# Patient Record
Sex: MALE | Race: WHITE | Employment: UNEMPLOYED | URBAN - METROPOLITAN AREA
[De-identification: names, ages, dates, MRNs, and addresses within clinical notes are randomized per-mention and may not be internally consistent; named-entity substitution may affect disease eponyms.]

---

## 2019-04-29 ENCOUNTER — APPOINTMENT (OUTPATIENT)
Dept: GENERAL RADIOLOGY | Age: 54
End: 2019-04-29
Payer: COMMERCIAL

## 2019-04-29 ENCOUNTER — HOSPITAL ENCOUNTER (EMERGENCY)
Age: 54
Discharge: ANOTHER HOSPITAL | End: 2019-04-30
Attending: EMERGENCY MEDICINE
Payer: COMMERCIAL

## 2019-04-29 VITALS
HEIGHT: 70 IN | WEIGHT: 175 LBS | BODY MASS INDEX: 25.05 KG/M2 | RESPIRATION RATE: 18 BRPM | HEART RATE: 85 BPM | SYSTOLIC BLOOD PRESSURE: 127 MMHG | OXYGEN SATURATION: 96 % | TEMPERATURE: 98.4 F | DIASTOLIC BLOOD PRESSURE: 84 MMHG

## 2019-04-29 DIAGNOSIS — R20.2 LEFT HAND PARESTHESIA: ICD-10-CM

## 2019-04-29 DIAGNOSIS — S50.852A FOREIGN BODY IN LEFT FOREARM, INITIAL ENCOUNTER: Primary | ICD-10-CM

## 2019-04-29 PROCEDURE — 6370000000 HC RX 637 (ALT 250 FOR IP): Performed by: EMERGENCY MEDICINE

## 2019-04-29 PROCEDURE — 96374 THER/PROPH/DIAG INJ IV PUSH: CPT

## 2019-04-29 PROCEDURE — 2500000003 HC RX 250 WO HCPCS: Performed by: PHYSICIAN ASSISTANT

## 2019-04-29 PROCEDURE — 6360000002 HC RX W HCPCS: Performed by: PHYSICIAN ASSISTANT

## 2019-04-29 PROCEDURE — 73090 X-RAY EXAM OF FOREARM: CPT

## 2019-04-29 PROCEDURE — 6370000000 HC RX 637 (ALT 250 FOR IP): Performed by: PHYSICIAN ASSISTANT

## 2019-04-29 PROCEDURE — 90715 TDAP VACCINE 7 YRS/> IM: CPT | Performed by: PHYSICIAN ASSISTANT

## 2019-04-29 PROCEDURE — 99284 EMERGENCY DEPT VISIT MOD MDM: CPT

## 2019-04-29 PROCEDURE — 90471 IMMUNIZATION ADMIN: CPT | Performed by: PHYSICIAN ASSISTANT

## 2019-04-29 RX ORDER — LIDOCAINE HYDROCHLORIDE 20 MG/ML
10 INJECTION, SOLUTION INFILTRATION; PERINEURAL ONCE
Status: COMPLETED | OUTPATIENT
Start: 2019-04-29 | End: 2019-04-29

## 2019-04-29 RX ORDER — HYDROCODONE BITARTRATE AND ACETAMINOPHEN 5; 325 MG/1; MG/1
1 TABLET ORAL ONCE
Status: COMPLETED | OUTPATIENT
Start: 2019-04-29 | End: 2019-04-29

## 2019-04-29 RX ORDER — NICOTINE 21 MG/24HR
1 PATCH, TRANSDERMAL 24 HOURS TRANSDERMAL DAILY
Status: DISCONTINUED | OUTPATIENT
Start: 2019-04-29 | End: 2019-04-30 | Stop reason: HOSPADM

## 2019-04-29 RX ORDER — MORPHINE SULFATE 4 MG/ML
4 INJECTION, SOLUTION INTRAMUSCULAR; INTRAVENOUS EVERY 30 MIN PRN
Status: DISCONTINUED | OUTPATIENT
Start: 2019-04-29 | End: 2019-04-30 | Stop reason: HOSPADM

## 2019-04-29 RX ADMIN — TETANUS TOXOID, REDUCED DIPHTHERIA TOXOID AND ACELLULAR PERTUSSIS VACCINE, ADSORBED 0.5 ML: 5; 2.5; 8; 8; 2.5 SUSPENSION INTRAMUSCULAR at 22:06

## 2019-04-29 RX ADMIN — HYDROCODONE BITARTRATE AND ACETAMINOPHEN 1 TABLET: 5; 325 TABLET ORAL at 22:04

## 2019-04-29 RX ADMIN — LIDOCAINE HYDROCHLORIDE 10 ML: 20 INJECTION, SOLUTION INFILTRATION; PERINEURAL at 22:05

## 2019-04-29 RX ADMIN — MORPHINE SULFATE 4 MG: 4 INJECTION INTRAVENOUS at 23:14

## 2019-04-29 ASSESSMENT — PAIN SCALES - GENERAL
PAINLEVEL_OUTOF10: 10

## 2019-04-29 ASSESSMENT — PAIN DESCRIPTION - ORIENTATION: ORIENTATION: LEFT

## 2019-04-29 ASSESSMENT — PAIN DESCRIPTION - PAIN TYPE: TYPE: ACUTE PAIN

## 2019-04-29 ASSESSMENT — PAIN DESCRIPTION - LOCATION: LOCATION: ARM

## 2019-04-30 NOTE — ED NOTES
Pt wife to nurses station stating pt is agitated and will \"walk out\" and pull his splinter out himself at 1030, wife notified Elizabeth Jackson was speaking to specialist,  Elizabeth Jackson [facility] notified.       Mark Harper RN  04/29/19 1509

## 2019-04-30 NOTE — ED PROVIDER NOTES
conjunction with the Advanced Practice Provider. For all further details of the patient's emergency department visit, please see the Advanced Practice Provider's documentation.         Willie Sherman,   04/29/19 1568

## 2019-04-30 NOTE — ED PROVIDER NOTES
Social History     Socioeconomic History    Marital status: Single     Spouse name: None    Number of children: None    Years of education: None    Highest education level: None   Occupational History    None   Social Needs    Financial resource strain: None    Food insecurity:     Worry: None     Inability: None    Transportation needs:     Medical: None     Non-medical: None   Tobacco Use    Smoking status: Current Some Day Smoker   Substance and Sexual Activity    Alcohol use: Yes     Comment: occasionally     Drug use: Never    Sexual activity: None   Lifestyle    Physical activity:     Days per week: None     Minutes per session: None    Stress: None   Relationships    Social connections:     Talks on phone: None     Gets together: None     Attends Shinto service: None     Active member of club or organization: None     Attends meetings of clubs or organizations: None     Relationship status: None    Intimate partner violence:     Fear of current or ex partner: None     Emotionally abused: None     Physically abused: None     Forced sexual activity: None   Other Topics Concern    None   Social History Narrative    None     History reviewed. No pertinent family history. PHYSICAL EXAM    VITAL SIGNS: /84   Pulse 85   Temp 98.4 °F (36.9 °C) (Oral)   Resp 18   Ht 5' 10\" (1.778 m)   Wt 175 lb (79.4 kg)   SpO2 96%   BMI 25.11 kg/m²   Constitutional:  Well developed, agitated male, nontoxic    HEENT:  Normocephalic, Atraumatic. PERRL, EOMI. Ear canals, nasal passages, oropharynx clear of blood or clear fluid. Musculoskeletal:  No gross deformities. No motor deficits. Distal sensation and capillary refill intact. Vascular: Distal pulses and capillary refill intact. Integument: To the medial volar distal forearm is protruding wide white but thin wooden fragment, entering two fingerwidth breath proximal to the ulnar styloid.   Fragment does not appear clearly to tunnel directly superficially and is not palpable. No exit wound but there is skin tenting to the mid medial forearm more on the dorsal aspect without breakthrough of skin and likely the end of the wooden fragment. Length of foreign body approximately 4-5 cm in length. Tenderness to palpation along the track of the foreign body under the skin to the medial forearm without pulsatile or seeping blood from the entry site and palpation along the foreign body region does cause a radicular tingling pain into the fourth and fifth digits. Decreased sensation with subjective tingling to the tips of fourth and fifth digits. 3/5  strength. No wrist drop. Limited wrist range of motion especially flexion secondary to pain and location of foreign body. Able to wiggle all digits without difficulty. See below for further details. Radial and ulnar pulses are palpable. Brisk capillary refill to all digits. Compartment otherwise soft throughout the left upper arm. Neurologic:  Awake and alert, normal flow of speech. CN 2-12 intact. Psychiatric: Cooperative, pleasant affect        RADIOLOGY/PROCEDURES         XR RADIUS ULNA LEFT (2 VIEWS) (Preliminary result)   Result time 04/29/19 22:08:38   Preliminary result by Radha Sr MD (04/29/19 22:08:38)                Impression:    1. Linear lucency in the medial distal forearm measuring up to 4.2 cm could  reflect a non radiopaque foreign body.  Suggest correlation with physical  exam.  2. No acute bony abnormality. Narrative:    EXAMINATION:  AP AND LATERAL XRAY VIEWS OF THE LEFT FOREARM    4/29/2019 9:55 pm    COMPARISON:  None    HISTORY:  ORDERING SYSTEM PROVIDED HISTORY: large splinter foreign body  TECHNOLOGIST PROVIDED HISTORY:  Reason for exam:->large splinter foreign body  Ordering Physician Provided Reason for Exam: large splinter foreign body  Acuity: Acute  Type of Exam: Initial    FINDINGS:  No acute fracture or dislocation.  Alignment and joint spaces are maintained. The bone mineralization is normal. No abnormal calcifications are present. There is small soft tissue irregularity at the ulnar aspect of the distal  forearm, which presumably reflects the soft tissue injury. Emilie Peaches is a  linear, 4.2 cm soft tissue lucency.  The lateral view demonstrates an  ill-defined curvilinear radiodensity in the dorsal distal forearm, likely  reflecting calcification or overlapping structures.  This is not felt to  reflect a foreign body.                Preliminary result by Bud Johnson MD (04/29/19 22:07:10)                Impression:    1. Linear lucency in the medial distal forearm measuring up to 4.2 cm could  reflect a non radiopaque foreign body.  Suggest correlation with physical  exam.  2. No acute bony abnormality. ED COURSE & MEDICAL DECISION MAKING       Vital signs and nursing notes reviewed during ED course. I have independently evaluated this patient . Supervising  - Dr. Naomi Ayers - present in the Emergency Department, available for consultation, throughout entirety of  patient care. All pertinent Lab data and radiographic results reviewed with patient at bedside. The patient and/or the family were informed of the results of any tests/labs/imaging, the treatment plan, and time was allotted to answer questions. I discussed possibility of infection, retained foreign body, tendon injury, nerve injury. Clinical  IMPRESSION    1. Foreign body in left forearm, initial encounter    2. Left hand paresthesia      Patient presents with penetrating foreign body along the medial forearm with paresthesias radiating into the fourth and fifth digit. On exam, no gross bony deformities. Extremity however there is a wooden foreign body that penetrates along the volar medial forearm, proximal to the ulnar styloid with skin tenting without an exit wound to the dorsal medial forearm.   Unable to clearly visualize the entire foreign body under the skin and palpation along the area of the foreign body does cause radicular tingling into the fourth and fifth digits. No wrist drop however wrist range of motion limited secondary to pain and location of the foreign body. Radial pulses and ulnar pulses are palpable. Test and this was updated in the ED today patient was started on Vicodin for pain. X-ray of the left radius/ulna shows a linear lucency in the medial distal forearm measuring up to 4.2 cm which could reflect a non-radiopaque foreign body which is consistent with location of the noted wooden foreign body. Given the location of the penetrating wound and paresthesia into the hand concerning for ulnar nerve injury or apraxia, we'll plan to consult with on-call orthopedist for further recommendations. I did consult with Dr. Vivek Francis however he also concerned for possible ulnar nerve injuryf lexor tendon injury and recommends transfer to ortho hand specialist for evaluation as he may require surgical wound washout and nerve repair. Plan for transfer was discussed with patient and family member at bedside. They're requesting transfer to [facility] hand specialty care. Started on empiric IV antibiotics, clindamycin given anaphylactic reaction to penicillins. I did consult with Dr. Dara House ortho hand - and discussed patient's history, ED presentation/course including any pertinent laboratory findings and imaging study findings. He/she is agreeable with transfer to [facility] ED for speciality evaluation. Dr. Loralyn Duane ED attending did accept patient for ED to ED transfer. Diagnosis and plan discussed in detail with patient who understands and agrees. Return to emergency Department precautions were discussed in detail with patient who understands and agrees.     I did discuss this patient's history, ED presentation/course with my attending physician - Dr. Reema Jean

## 2020-06-10 ENCOUNTER — HOSPITAL ENCOUNTER (EMERGENCY)
Age: 55
Discharge: HOME | End: 2020-06-10
Payer: COMMERCIAL

## 2020-06-10 ENCOUNTER — APPOINTMENT (OUTPATIENT)
Dept: GENERAL RADIOLOGY | Age: 55
End: 2020-06-10
Payer: COMMERCIAL

## 2020-06-10 VITALS
DIASTOLIC BLOOD PRESSURE: 104 MMHG | HEIGHT: 70 IN | TEMPERATURE: 98.4 F | OXYGEN SATURATION: 99 % | WEIGHT: 200 LBS | SYSTOLIC BLOOD PRESSURE: 136 MMHG | HEART RATE: 83 BPM | BODY MASS INDEX: 28.63 KG/M2 | RESPIRATION RATE: 18 BRPM

## 2020-06-10 PROCEDURE — 96372 THER/PROPH/DIAG INJ SC/IM: CPT

## 2020-06-10 PROCEDURE — 72100 X-RAY EXAM L-S SPINE 2/3 VWS: CPT

## 2020-06-10 PROCEDURE — 99283 EMERGENCY DEPT VISIT LOW MDM: CPT

## 2020-06-10 PROCEDURE — 6370000000 HC RX 637 (ALT 250 FOR IP): Performed by: PHYSICIAN ASSISTANT

## 2020-06-10 PROCEDURE — 6360000002 HC RX W HCPCS: Performed by: PHYSICIAN ASSISTANT

## 2020-06-10 RX ORDER — METHOCARBAMOL 500 MG/1
500 TABLET, FILM COATED ORAL 4 TIMES DAILY
Qty: 40 TABLET | Refills: 0 | Status: SHIPPED | OUTPATIENT
Start: 2020-06-10 | End: 2020-06-20

## 2020-06-10 RX ORDER — METHYLPREDNISOLONE 4 MG/1
TABLET ORAL
Qty: 1 KIT | Refills: 0 | Status: SHIPPED | OUTPATIENT
Start: 2020-06-10 | End: 2020-06-16

## 2020-06-10 RX ORDER — HYDROCODONE BITARTRATE AND ACETAMINOPHEN 5; 325 MG/1; MG/1
1 TABLET ORAL ONCE
Status: COMPLETED | OUTPATIENT
Start: 2020-06-10 | End: 2020-06-10

## 2020-06-10 RX ORDER — HYDROCODONE BITARTRATE AND ACETAMINOPHEN 5; 325 MG/1; MG/1
1 TABLET ORAL EVERY 4 HOURS PRN
Qty: 15 TABLET | Refills: 0 | Status: SHIPPED | OUTPATIENT
Start: 2020-06-10 | End: 2020-06-17

## 2020-06-10 RX ORDER — DIAZEPAM 5 MG/1
5 TABLET ORAL ONCE
Status: COMPLETED | OUTPATIENT
Start: 2020-06-10 | End: 2020-06-10

## 2020-06-10 RX ORDER — KETOROLAC TROMETHAMINE 30 MG/ML
30 INJECTION, SOLUTION INTRAMUSCULAR; INTRAVENOUS ONCE
Status: COMPLETED | OUTPATIENT
Start: 2020-06-10 | End: 2020-06-10

## 2020-06-10 RX ADMIN — KETOROLAC TROMETHAMINE 30 MG: 30 INJECTION, SOLUTION INTRAMUSCULAR; INTRAVENOUS at 08:52

## 2020-06-10 RX ADMIN — HYDROCODONE BITARTRATE AND ACETAMINOPHEN 1 TABLET: 5; 325 TABLET ORAL at 08:52

## 2020-06-10 RX ADMIN — DIAZEPAM 5 MG: 5 TABLET ORAL at 08:52

## 2020-06-10 ASSESSMENT — PAIN SCALES - GENERAL
PAINLEVEL_OUTOF10: 8
PAINLEVEL_OUTOF10: 10

## 2020-06-10 ASSESSMENT — PAIN DESCRIPTION - DESCRIPTORS: DESCRIPTORS: STABBING

## 2020-06-10 ASSESSMENT — PAIN DESCRIPTION - FREQUENCY: FREQUENCY: CONTINUOUS

## 2020-06-10 ASSESSMENT — PAIN DESCRIPTION - LOCATION: LOCATION: BACK

## 2020-06-10 ASSESSMENT — PAIN DESCRIPTION - ORIENTATION: ORIENTATION: LOWER

## 2020-06-10 ASSESSMENT — PAIN DESCRIPTION - PAIN TYPE: TYPE: ACUTE PAIN

## 2020-06-10 NOTE — ED NOTES
Reviewed discharge instructions with patient and family. All voice understanding/deny questions. Wheelchair offered, declines. Ambulates without difficulty.        Analy Segovia RN  06/10/20 [id number]

## 2020-06-10 NOTE — ED PROVIDER NOTES
afebrile    Constitutional:  Well developed, well nourished. HENT:  Atraumatic,  Moist mucus membranes. Eyes:  No orbital trauma. No scleral icterus. Neck: No JVD, supple. No enlarged lymph nodes. Cardiovascular:  Normal rate, regular rhythm, no murmurs/rubs/gallops  Respiratory:  No respiratory distress, normal breath sounds. Abdomen: Bowel sounds normal, Soft, No tenderness, no masses. Musculoskeletal:     No lower extremity swelling, discoloration, focal tenderness, palpable cord. Negative Julinaa's sign negative  Integument:  Well hydrated, no rash, no pallor. Back:   - No gross deformity, swelling, or discoloration.    -  + generalized lumbar and Paralumbar tenderness without focus. No masses, fluctuance, warmth, or skin changes.  - No localized midline bony tenderness.   - No change in pain with forward flexion  - SLR test negative     No CVA tenderness to percussion  Neurologic:    No obvious neurological deficits. Patient moves without obvious difficulty or weakness. Vascular:    Femoral & Distal pulses, & capillary refill intact bilateral lower extremities    RADIOLOGY/PROCEDURES    Xr Lumbar Spine (2-3 Views)    Result Date: 6/10/2020  EXAMINATION: THREE XRAY VIEWS OF THE LUMBAR SPINE 6/10/2020 8:52 am COMPARISON: None. HISTORY: ORDERING SYSTEM PROVIDED HISTORY: low back pain TECHNOLOGIST PROVIDED HISTORY: Reason for exam:->low back pain Reason for Exam: lumbar pain since this morn Acuity: Acute Type of Exam: Initial Relevant Medical/Surgical History: pt sts prev disc repair FINDINGS: Moderate degenerative disc disease identified at L4-5. Mild degenerative disc disease is appreciated at L2-L3. No acute fracture or dislocation is evident. Moderate degenerative disc disease at L4-L5. Mild degenerative disc disease at L2-L3. No evidence of acute fracture.          ED COURSE & MEDICAL DECISION MAKING                     Based on Pt's history (including stratification of the above red

## 2020-08-26 ENCOUNTER — APPOINTMENT (OUTPATIENT)
Dept: GENERAL RADIOLOGY | Age: 55
End: 2020-08-26
Payer: COMMERCIAL

## 2020-08-27 ENCOUNTER — HOSPITAL ENCOUNTER (EMERGENCY)
Age: 55
Discharge: HOME | End: 2020-08-27
Payer: COMMERCIAL

## 2020-08-27 VITALS
DIASTOLIC BLOOD PRESSURE: 82 MMHG | HEART RATE: 92 BPM | SYSTOLIC BLOOD PRESSURE: 122 MMHG | TEMPERATURE: 98.1 F | OXYGEN SATURATION: 98 % | RESPIRATION RATE: 18 BRPM

## 2020-08-27 LAB
ALBUMIN SERPL-MCNC: 4.5 GM/DL (ref 3.4–5)
ALP BLD-CCNC: 86 IU/L (ref 40–128)
ALT SERPL-CCNC: 52 U/L (ref 10–40)
ANION GAP SERPL CALCULATED.3IONS-SCNC: 16 MMOL/L (ref 4–16)
AST SERPL-CCNC: 44 IU/L (ref 15–37)
BASOPHILS ABSOLUTE: 0.1 K/CU MM
BASOPHILS RELATIVE PERCENT: 1.1 % (ref 0–1)
BILIRUB SERPL-MCNC: 0.9 MG/DL (ref 0–1)
BUN BLDV-MCNC: 34 MG/DL (ref 6–23)
CALCIUM SERPL-MCNC: 9.6 MG/DL (ref 8.3–10.6)
CHLORIDE BLD-SCNC: 102 MMOL/L (ref 99–110)
CO2: 20 MMOL/L (ref 21–32)
CREAT SERPL-MCNC: 1.5 MG/DL (ref 0.9–1.3)
DIFFERENTIAL TYPE: ABNORMAL
EOSINOPHILS ABSOLUTE: 0.2 K/CU MM
EOSINOPHILS RELATIVE PERCENT: 2 % (ref 0–3)
GFR AFRICAN AMERICAN: 59 ML/MIN/1.73M2
GFR NON-AFRICAN AMERICAN: 49 ML/MIN/1.73M2
GLUCOSE BLD-MCNC: 114 MG/DL (ref 70–99)
HCT VFR BLD CALC: 44.9 % (ref 42–52)
HEMOGLOBIN: 14.8 GM/DL (ref 13.5–18)
IMMATURE NEUTROPHIL %: 0.4 % (ref 0–0.43)
LYMPHOCYTES ABSOLUTE: 2.4 K/CU MM
LYMPHOCYTES RELATIVE PERCENT: 32.3 % (ref 24–44)
MCH RBC QN AUTO: 31.2 PG (ref 27–31)
MCHC RBC AUTO-ENTMCNC: 33 % (ref 32–36)
MCV RBC AUTO: 94.7 FL (ref 78–100)
MONOCYTES ABSOLUTE: 1 K/CU MM
MONOCYTES RELATIVE PERCENT: 13.5 % (ref 0–4)
NUCLEATED RBC %: 0 %
PDW BLD-RTO: 12.9 % (ref 11.7–14.9)
PLATELET # BLD: 256 K/CU MM (ref 140–440)
PMV BLD AUTO: 9.1 FL (ref 7.5–11.1)
POTASSIUM SERPL-SCNC: 3.3 MMOL/L (ref 3.5–5.1)
RBC # BLD: 4.74 M/CU MM (ref 4.6–6.2)
SEGMENTED NEUTROPHILS ABSOLUTE COUNT: 3.8 K/CU MM
SEGMENTED NEUTROPHILS RELATIVE PERCENT: 50.7 % (ref 36–66)
SODIUM BLD-SCNC: 138 MMOL/L (ref 135–145)
TOTAL IMMATURE NEUTOROPHIL: 0.03 K/CU MM
TOTAL NUCLEATED RBC: 0 K/CU MM
TOTAL PROTEIN: 7.6 GM/DL (ref 6.4–8.2)
WBC # BLD: 7.5 K/CU MM (ref 4–10.5)

## 2020-08-27 PROCEDURE — 85025 COMPLETE CBC W/AUTO DIFF WBC: CPT

## 2020-08-27 PROCEDURE — 6370000000 HC RX 637 (ALT 250 FOR IP): Performed by: PHYSICIAN ASSISTANT

## 2020-08-27 PROCEDURE — 80053 COMPREHEN METABOLIC PANEL: CPT

## 2020-08-27 PROCEDURE — 94640 AIRWAY INHALATION TREATMENT: CPT

## 2020-08-27 PROCEDURE — 99284 EMERGENCY DEPT VISIT MOD MDM: CPT

## 2020-08-27 RX ORDER — BENZONATATE 100 MG/1
100 CAPSULE ORAL 3 TIMES DAILY PRN
Qty: 10 CAPSULE | Refills: 0 | Status: SHIPPED | OUTPATIENT
Start: 2020-08-27 | End: 2020-09-03

## 2020-08-27 RX ORDER — BENZONATATE 100 MG/1
100 CAPSULE ORAL ONCE
Status: COMPLETED | OUTPATIENT
Start: 2020-08-27 | End: 2020-08-27

## 2020-08-27 RX ORDER — AZITHROMYCIN 250 MG/1
TABLET, FILM COATED ORAL
Qty: 1 PACKET | Refills: 0 | Status: SHIPPED | OUTPATIENT
Start: 2020-08-27 | End: 2020-08-31

## 2020-08-27 RX ORDER — POTASSIUM CHLORIDE 20 MEQ/1
40 TABLET, EXTENDED RELEASE ORAL ONCE
Status: COMPLETED | OUTPATIENT
Start: 2020-08-27 | End: 2020-08-27

## 2020-08-27 RX ORDER — ALBUTEROL SULFATE 90 UG/1
2 AEROSOL, METERED RESPIRATORY (INHALATION) ONCE
Status: COMPLETED | OUTPATIENT
Start: 2020-08-27 | End: 2020-08-27

## 2020-08-27 RX ORDER — ALBUTEROL SULFATE 90 UG/1
2 AEROSOL, METERED RESPIRATORY (INHALATION) EVERY 4 HOURS PRN
Qty: 1 INHALER | Refills: 1 | Status: SHIPPED | OUTPATIENT
Start: 2020-08-27 | End: 2020-09-26

## 2020-08-27 RX ADMIN — POTASSIUM CHLORIDE 40 MEQ: 1500 TABLET, EXTENDED RELEASE ORAL at 02:30

## 2020-08-27 RX ADMIN — BENZONATATE 100 MG: 100 CAPSULE ORAL at 02:30

## 2020-08-27 RX ADMIN — ALBUTEROL SULFATE 2 PUFF: 90 AEROSOL, METERED RESPIRATORY (INHALATION) at 03:00

## 2020-08-27 NOTE — ED PROVIDER NOTES
Triage Chief Complaint:   Cough (Reports coughing up parasites for the past 2 days)    [place]:  Yvette Sawyer is a 54 y.o. male that presents with a complaint of cough. X2 days. He states he has been coughing up brown stuff. And makes him concerned that there are parasites as his wife told him that she looked at the particular that he is coughing up under the microscope and it looks like parasites with legs. He denies any fever chills nausea vomiting diarrhea abdominal pain chest pain back pain. No COVID exposure. No lightheadedness or dizziness. He states that he vapes. But rarely smokes cigarettes    ROS:  At least 06 systems reviewed and otherwise negative except as in the [address] No past medical history on file. No past surgical history on file. No family history on file. Social History     Socioeconomic History    Marital status: Single     Spouse name: Not on file    Number of children: Not on file    Years of education: Not on file    Highest education level: Not on file   Occupational History    Not on file   Social Needs    Financial resource strain: Not on file    Food insecurity     Worry: Not on file     Inability: Not on file    Transportation needs     Medical: Not on file     Non-medical: Not on file   Tobacco Use    Smoking status: Current Some Day Smoker    Tobacco comment: only a couple a day, pt trying to quit.    Substance and Sexual Activity    Alcohol use: Yes     Comment: occasionally     Drug use: Never    Sexual activity: Not on file   Lifestyle    Physical activity     Days per week: Not on file     Minutes per session: Not on file    Stress: Not on file   Relationships    Social connections     Talks on phone: Not on file     Gets together: Not on file     Attends Hoahaoism service: Not on file     Active member of club or organization: Not on file     Attends meetings of clubs or organizations: Not on file     Relationship status: Not on file    Intimate partner violence     Fear of current or ex partner: Not on file     Emotionally abused: Not on file     Physically abused: Not on file     Forced sexual activity: Not on file   Other Topics Concern    Not on file   Social History Narrative    Not on file     Current Facility-Administered Medications   Medication Dose Route Frequency Provider Last Rate Last Dose    albuterol sulfate  (90 Base) MCG/ACT inhaler 2 puff  2 puff Inhalation Once Berkley Gordon PA-C         Current Outpatient Medications   Medication Sig Dispense Refill    albuterol sulfate HFA (PROVENTIL HFA) 108 (90 Base) MCG/ACT inhaler Inhale 2 puffs into the lungs every 4 hours as needed for Wheezing or Shortness of Breath With spacer (and mask if indicated). Thanks. 1 Inhaler 1    benzonatate (TESSALON PERLES) 100 MG capsule Take 1 capsule by mouth 3 times daily as needed for Cough 10 capsule 0    azithromycin (ZITHROMAX Z-MERRY) 250 MG tablet Take 2 tablets (500 mg) on Day 1, and then take 1 tablet (250 mg) on days 2 through 5. 1 packet 0     Allergies   Allergen Reactions    Bee Venom Anaphylaxis    Pcn [Penicillins] Anaphylaxis       Nursing Notes Reviewed    Physical Exam:  ED Triage Vitals [08/27/20 0001]   Enc Vitals Group      /89      Pulse 97      Resp 18      Temp 98.1 °F (36.7 °C)      Temp Source Oral      SpO2 97 %      Weight       Height       Head Circumference       Peak Flow       Pain Score       Pain Loc       Pain Edu? Excl. in [address]? GENERAL APPEARANCE: Awake and alert. Cooperative. No acute distress. HEAD: Normocephalic. Atraumatic. EYES: EOM's grossly intact. Sclera anicteric. PERRLA. Conjunctiva non injected. No discharge  ENT: Mucous membranes are moist. No trismus. Posterior Pharynx non injected, no tongue swelling, airway patent, no tonsillar edema. No exudates noted. No abscess. No discharge. Middle ear spaces clear. Tympanic membrane non injected. Auditory canal clear. NECK: Supple.  No meningismus. No palpable masses. No lymphadenopathy. CARDIOVASCULAR: RRR. No rubs, murmurs, gallops, clicks. Radial pulses 2+. Pedal Pulses 2+. Capillary refill <2 seconds. LUNGS: Respirations unlabored. CTAB. ABDOMEN: Soft. Non-tender. No guarding or rebound. No organomegaly. No palpable masses  MUSCULOSKELETAL: No acute deformities. SKIN: Warm and dry. No rash, No erythema, No edema. No ecchymoses. NEUROLOGICAL: No gross facial drooping. Moves all 4 extremities spontaneously. PSYCHIATRIC: Normal mood.     I have reviewed and interpreted all of the currently available lab results from this visit (if applicable):  Results for orders placed or performed during the hospital encounter of 08/27/20   CBC with Auto Diff   Result Value Ref Range    WBC 7.5 4.0 - 10.5 K/CU MM    RBC 4.74 4.6 - 6.2 M/CU MM    Hemoglobin 14.8 13.5 - 18.0 GM/DL    Hematocrit 44.9 42 - 52 %    MCV 94.7 78 - 100 FL    MCH 31.2 (H) 27 - 31 PG    MCHC 33.0 32.0 - 36.0 %    RDW 12.9 11.7 - 14.9 %    Platelets 539 069 - 161 K/CU MM    MPV 9.1 7.5 - 11.1 FL    Differential Type AUTOMATED DIFFERENTIAL     Segs Relative 50.7 36 - 66 %    Lymphocytes % 32.3 24 - 44 %    Monocytes % 13.5 (H) 0 - 4 %    Eosinophils % 2.0 0 - 3 %    Basophils % 1.1 (H) 0 - 1 %    Segs Absolute 3.8 K/CU MM    Lymphocytes Absolute 2.4 K/CU MM    Monocytes Absolute 1.0 K/CU MM    Eosinophils Absolute 0.2 K/CU MM    Basophils Absolute 0.1 K/CU MM    Nucleated RBC % 0.0 %    Total Nucleated RBC 0.0 K/CU MM    Total Immature Neutrophil 0.03 K/CU MM    Immature Neutrophil % 0.4 0 - 0.43 %   CMP   Result Value Ref Range    Sodium 138 135 - 145 MMOL/L    Potassium 3.3 (L) 3.5 - 5.1 MMOL/L    Chloride 102 99 - 110 mMol/L    CO2 20 (L) 21 - 32 MMOL/L    BUN 34 (H) 6 - 23 MG/DL    CREATININE 1.5 (H) 0.9 - 1.3 MG/DL    Glucose 114 (H) 70 - 99 MG/DL    Calcium 9.6 8.3 - 10.6 MG/DL    Alb 4.5 3.4 - 5.0 GM/DL    Total Protein 7.6 6.4 - 8.2 GM/DL    Total Bilirubin 0.9 0.0 - 1.0 MG/DL    ALT 52 (H) 10 - 40 U/L    AST 44 (H) 15 - 37 IU/L    Alkaline Phosphatase 86 40 - 128 IU/L    GFR Non- 49 (L) >60 mL/min/1.73m2    GFR  59 (L) >60 mL/min/1.73m2    Anion Gap 16 4 - 16      Radiographs (if obtained):  [] The following radiograph was interpreted by myself in the absence of a radiologist:   [] Radiologist's Report Reviewed:  XR CHEST (2 VW)   Final Result   No acute cardiopulmonary process. EKG (if obtained):   Please See Note of attending physician for EKG interpretation. Chart review shows recent radiograph(s):  Xr Chest (2 Vw)    Result Date: 8/27/2020  EXAMINATION: TWO XRAY VIEWS OF THE CHEST 8/26/2020 11:46 pm COMPARISON: None. HISTORY: ORDERING SYSTEM PROVIDED HISTORY: cough TECHNOLOGIST PROVIDED HISTORY: Reason for exam:->cough FINDINGS: There is no acute consolidation or effusion. There is no pneumothorax. The mediastinal structures are unremarkable. The upper abdomen is unremarkable. The extrathoracic soft tissues are unremarkable. There is no acute osseous abnormality. No acute cardiopulmonary process. MDM:   History Signs and symptoms congruent with URI. Doubt meningitis, pneumonia, bronchitis, respiratory distress, asthma exacerbation, retropharengial abscess, ludwigs angina, chronic sinusitis, otitis, streptococcal pharyngitis, conjunctivitis, pulmonary embolism, pneumothorax, other. Xray is negative for any acute cardiopulmonary processes. Pt is to be discharged home. Pt is told to return immediately to the emergency department if he has any new, worrisome or worsening symptoms. Pt is to follow up with PCP within 2 days. Patient vocalizes agreement and understanding with this plan and he has no questions upon disposition. Pt is comfortable upon disposition home. Patient is stable, Patients vital signs are stable. Vital signs and nursing notes reviewed during ED course.      I independently saw patient today

## 2020-12-03 ENCOUNTER — APPOINTMENT (OUTPATIENT)
Dept: GENERAL RADIOLOGY | Age: 55
End: 2020-12-03
Payer: COMMERCIAL

## 2020-12-03 ENCOUNTER — HOSPITAL ENCOUNTER (EMERGENCY)
Age: 55
Discharge: HOME | End: 2020-12-03
Attending: EMERGENCY MEDICINE
Payer: COMMERCIAL

## 2020-12-03 ENCOUNTER — APPOINTMENT (OUTPATIENT)
Dept: CT IMAGING | Age: 55
End: 2020-12-03
Payer: COMMERCIAL

## 2020-12-03 VITALS
DIASTOLIC BLOOD PRESSURE: 70 MMHG | OXYGEN SATURATION: 92 % | HEART RATE: 79 BPM | SYSTOLIC BLOOD PRESSURE: 102 MMHG | WEIGHT: 200 LBS | RESPIRATION RATE: 16 BRPM | BODY MASS INDEX: 28.63 KG/M2 | HEIGHT: 70 IN

## 2020-12-03 PROCEDURE — 6370000000 HC RX 637 (ALT 250 FOR IP): Performed by: PHYSICIAN ASSISTANT

## 2020-12-03 PROCEDURE — 72192 CT PELVIS W/O DYE: CPT

## 2020-12-03 PROCEDURE — 73502 X-RAY EXAM HIP UNI 2-3 VIEWS: CPT

## 2020-12-03 PROCEDURE — 99285 EMERGENCY DEPT VISIT HI MDM: CPT

## 2020-12-03 RX ORDER — IBUPROFEN 600 MG/1
600 TABLET ORAL EVERY 6 HOURS PRN
Qty: 30 TABLET | Refills: 0 | Status: SHIPPED | OUTPATIENT
Start: 2020-12-03

## 2020-12-03 RX ORDER — LIDOCAINE 4 G/G
1 PATCH TOPICAL ONCE
Status: DISCONTINUED | OUTPATIENT
Start: 2020-12-03 | End: 2020-12-03 | Stop reason: HOSPADM

## 2020-12-03 RX ORDER — LIDOCAINE 4 G/G
1 PATCH TOPICAL DAILY PRN
Qty: 15 PATCH | Refills: 0 | Status: SHIPPED | OUTPATIENT
Start: 2020-12-03 | End: 2021-01-02

## 2020-12-03 ASSESSMENT — PAIN DESCRIPTION - PAIN TYPE: TYPE: ACUTE PAIN

## 2020-12-03 ASSESSMENT — PAIN DESCRIPTION - ORIENTATION: ORIENTATION: LEFT

## 2020-12-03 ASSESSMENT — PAIN DESCRIPTION - LOCATION: LOCATION: HIP

## 2020-12-03 ASSESSMENT — PAIN SCALES - GENERAL: PAINLEVEL_OUTOF10: 7

## 2020-12-03 NOTE — ED NOTES
Reviewed discharge instructions and prescriptions, pt V/U. Pt discharged home.      Diana aGrcia RN  12/03/20 [id number]

## 2020-12-04 NOTE — ED PROVIDER NOTES
EMERGENCY DEPARTMENT ENCOUNTER      PCP: No primary care provider on file. CHIEF COMPLAINT    Chief Complaint   Patient presents with    Hip Pain     left-fell on ice about 1 hour ago       HPI    Aurora Mendoza is a 54 y.o. male who presents with Left hip pain after fall. Onset was about 1 hr PTA. The context was patient slipped on ice and fell landing on left hip. Patient denies hitting his head and denies loss of consciousness. Duration of pain has been constant since the onset. The pain severity is 7/10. The patient has no associated other injuries. The pain is aggravated by hip movement, ambulation. Patient has not tried medication for symptoms. Patient denies fever, chills, distal numbness, tingling, weakness, functional deficit. Patient denies other injuries. REVIEW OF SYSTEMS    General: Denies fever or chills  Cardiac: Denies chest pain  Pulmonary: Denies shortness of breath  GI: Denies abdominal pain, vomiting, or diarrhea  : No dysuria or hematuria  Neuro:  Denies numbness, tingling, weakness. Denies headache. Denies loss of consciousness. Musculoskeletal: See HPI; Denies any other musculoskeletal injuries, including chest wall and back. All other review of systems are negative  See HPI and nursing notes for additional information       [address]    History reviewed. No pertinent past medical history.   Past Surgical History:   Procedure Laterality Date    ARM SURGERY         CURRENT MEDICATIONS    Current Outpatient Rx   Medication Sig Dispense Refill    lidocaine 4 % external patch Place 1 patch onto the skin daily as needed (for pain) 12 hrs on, 12 hrs off. 15 patch 0    ibuprofen (ADVIL;MOTRIN) 600 MG tablet Take 1 tablet by mouth every 6 hours as needed for Pain 30 tablet 0    albuterol sulfate HFA (PROVENTIL HFA) 108 (90 Base) MCG/ACT inhaler Inhale 2 puffs into the lungs every 4 hours as needed for Wheezing or Shortness of Breath With spacer (and mask if indicated). Thanks. 1 Inhaler 1       ALLERGIES    Allergies   Allergen Reactions    Bee Venom Anaphylaxis    Pcn [Penicillins] Anaphylaxis       SOCIAL & FAMILY HISTORY    Social History     Socioeconomic History    Marital status: Single     Spouse name: None    Number of children: None    Years of education: None    Highest education level: None   Occupational History    None   Social Needs    Financial resource strain: None    Food insecurity     Worry: None     Inability: None    Transportation needs     Medical: None     Non-medical: None   Tobacco Use    Smoking status: Current Some Day Smoker     Types: Cigarettes    Smokeless tobacco: Never Used    Tobacco comment: only a couple a day, pt trying to quit. Substance and Sexual Activity    Alcohol use: Yes     Comment: occasionally     Drug use: Never    Sexual activity: None   Lifestyle    Physical activity     Days per week: None     Minutes per session: None    Stress: None   Relationships    Social connections     Talks on phone: None     Gets together: None     Attends Jewish service: None     Active member of club or organization: None     Attends meetings of clubs or organizations: None     Relationship status: None    Intimate partner violence     Fear of current or ex partner: None     Emotionally abused: None     Physically abused: None     Forced sexual activity: None   Other Topics Concern    None   Social History Narrative    None     History reviewed. No pertinent family history. PHYSICAL EXAM    VITAL SIGNS: /70   Pulse 79   Resp 16   Ht 5' 10\" (1.778 m)   Wt 200 lb (90.7 kg)   SpO2 92%   BMI 28.70 kg/m²   Constitutional:  Well developed, Appears comfortable  ENT:  Atraumatic. PERRL, EOMI. Musculoskeletal:    Left HIP exam:   -Inspection:   No gross deformities, swelling, discolorations. Patient spontaneously moves affect leg. No rash. - Palpation: + Tenderness over anterior aspect of hip.  No lateral or posterior hip TTP. No redness. No warmth to palpation        -ROM:  Full AROM    No swelling, discoloration, or tenderness to palpation of the ipsilateral knee. Full AROM of ipsilateral knee. Remaining lower and upper extremity exam as well as back exam without abnormality. No spinal TTP. No paraspinal TTP. No palpable step-offs or deformities of spine. Respiratory:  Lungs clear. Vascular: Distal pulses (DP, PT) intact on affected side. Integument:  Well hydrated, no rash   Neurologic:  Awake and alert, normal flow of speech. Cranial nerves II through XII intact. No obvious focal deficits. Distal sensation, motor, capillary refill intact. Psychiatric: Cooperative, pleasant affect        RADIOLOGY/PROCEDURES    CT PELVIS WO CONTRAST Additional Contrast? None   Final Result   1. No acute fracture. 2. Moderate degenerative changes are seen involving the left hip. XR HIP 2-3 VW W PELVIS LEFT   Final Result   Questionable nondisplaced subcapital fracture of the left femoral neck. CT   scan or preferably MRI may be obtained to confirm. ED COURSE & MEDICAL DECISION MAKING       Vital signs and nursing notes reviewed during ED course. I have independently evaluated this patient. Supervising physician present in the Emergency Department, available for consultation, throughout entirety of  patient care. Patient presents as above. History and exam consistent with musculoskeletal pain of hip after mechanical fall. While in the ED today, left hip and pelvis xray was obtained and reveals questionable nondisplaced subcapital fracture of left femoral neck per radiologist read who recommended CT or MRI to confirm. CT pelvis obtained and reveals that there is no acute fracture but rather some moderate degenerative changes in the left hip. Patient treated with lidocaine patch in the ED with some improvement in pain. Patient declined other pain medication in the ED.    Patient is nontoxic appearing. Vital signs stable. Patient denies fever. Patient is stable for outpatient management. Patient discharged home with prescriptions for lidocaine patches and ibuprofen- we discussed medications. All pertinent Lab data and radiographic results reviewed with patient at bedside. The patient and/or the family were informed of the results of any tests/labs/imaging, the treatment plan, and time was allotted to answer questions. Diagnosis, disposition, and treatment plan reviewed in detail with patient. Patient understands and agrees to follow-up with walk in clinic for recheck in 2 days and orthopedist for recheck in 5-7 days as needed as we discussed today. Patient understands and agrees to return to emergency department for any new or worsening symptoms - strict return precautions given. Clinical  IMPRESSION    1. Left hip pain    2.  Fall due to slipping on ice or snow, initial encounter        Disposition referral (if applicable):  [facility]  [address] [place] [address]. [phone]  [phone]  Call today  [address] in 2 days    Smiley Vega MD  [address]  [phone]      Recheck in 5-7 days, As needed    [facility] Emergency Department  [facility] [phone]  Go to   Return to ED if symptoms worsen or new symptoms      Disposition medications (if applicable):  Discharge Medication List as of 12/3/2020  4:39 AM      START taking these medications    Details   lidocaine 4 % external patch Place 1 patch onto the skin daily as needed (for pain) 12 hrs on, 12 hrs off., Transdermal, DAILY PRN Starting Thu 12/3/2020, Until Sat 1/2/2021, For 30 days, Disp-15 patch,R-0, Print      ibuprofen (ADVIL;MOTRIN) 600 MG tablet Take 1 tablet by mouth every 6 hours as needed for Pain, Disp-30 tablet,R-0Print             Comment: Please note this report has been produced using speech recognition software and may contain errors related to that system including errors in grammar, punctuation, and spelling, as well as words and phrases that may be inappropriate. If there are any questions or concerns please feel free to contact the dictating provider for clarification.         Juana Rooney PA-C  12/04/20 0128

## 2020-12-04 NOTE — ED PROVIDER NOTES
Patient signed up for in error. I did not see or evaluate this patient.       [address],   12/03/20 2004

## 2021-03-26 ENCOUNTER — APPOINTMENT (OUTPATIENT)
Dept: GENERAL RADIOLOGY | Age: 56
DRG: 263 | End: 2021-03-26
Payer: COMMERCIAL

## 2021-03-26 ENCOUNTER — APPOINTMENT (OUTPATIENT)
Dept: ULTRASOUND IMAGING | Age: 56
DRG: 263 | End: 2021-03-26
Payer: COMMERCIAL

## 2021-03-26 ENCOUNTER — APPOINTMENT (OUTPATIENT)
Dept: CT IMAGING | Age: 56
DRG: 263 | End: 2021-03-26
Payer: COMMERCIAL

## 2021-03-26 ENCOUNTER — HOSPITAL ENCOUNTER (INPATIENT)
Age: 56
LOS: 2 days | Discharge: HOME | DRG: 263 | End: 2021-03-29
Attending: STUDENT IN AN ORGANIZED HEALTH CARE EDUCATION/TRAINING PROGRAM | Admitting: STUDENT IN AN ORGANIZED HEALTH CARE EDUCATION/TRAINING PROGRAM
Payer: COMMERCIAL

## 2021-03-26 DIAGNOSIS — J18.9 PNEUMONIA DUE TO ORGANISM: ICD-10-CM

## 2021-03-26 DIAGNOSIS — E87.6 HYPOKALEMIA: ICD-10-CM

## 2021-03-26 DIAGNOSIS — E83.42 HYPOMAGNESEMIA: ICD-10-CM

## 2021-03-26 DIAGNOSIS — R79.89 ELEVATED LFTS: ICD-10-CM

## 2021-03-26 DIAGNOSIS — K81.9 CHOLECYSTITIS: Primary | ICD-10-CM

## 2021-03-26 LAB
ALBUMIN SERPL-MCNC: 2.9 GM/DL (ref 3.4–5)
ALP BLD-CCNC: 386 IU/L (ref 40–129)
ALT SERPL-CCNC: 883 U/L (ref 10–40)
ANION GAP SERPL CALCULATED.3IONS-SCNC: 9 MMOL/L (ref 4–16)
AST SERPL-CCNC: 744 IU/L (ref 15–37)
BACTERIA: NEGATIVE /HPF
BASOPHILS ABSOLUTE: 0.1 K/CU MM
BASOPHILS RELATIVE PERCENT: 1.3 % (ref 0–1)
BILIRUB SERPL-MCNC: 1.8 MG/DL (ref 0–1)
BILIRUBIN URINE: ABNORMAL MG/DL
BLOOD, URINE: NEGATIVE
BUN BLDV-MCNC: 16 MG/DL (ref 6–23)
CALCIUM SERPL-MCNC: 8.4 MG/DL (ref 8.3–10.6)
CHLORIDE BLD-SCNC: 101 MMOL/L (ref 99–110)
CLARITY: ABNORMAL
CO2: 27 MMOL/L (ref 21–32)
COLOR: ABNORMAL
CREAT SERPL-MCNC: 0.8 MG/DL (ref 0.9–1.3)
DIFFERENTIAL TYPE: ABNORMAL
EOSINOPHILS ABSOLUTE: 0.2 K/CU MM
EOSINOPHILS RELATIVE PERCENT: 2.9 % (ref 0–3)
GFR AFRICAN AMERICAN: >60 ML/MIN/1.73M2
GFR NON-AFRICAN AMERICAN: >60 ML/MIN/1.73M2
GLUCOSE BLD-MCNC: 122 MG/DL (ref 70–99)
GLUCOSE, URINE: NEGATIVE MG/DL
HCT VFR BLD CALC: 40.7 % (ref 42–52)
HEMOGLOBIN: 13.3 GM/DL (ref 13.5–18)
ICTOTEST: NEGATIVE
IMMATURE NEUTROPHIL %: 0.3 % (ref 0–0.43)
KETONES, URINE: NEGATIVE MG/DL
LEUKOCYTE ESTERASE, URINE: NEGATIVE
LIPASE: 22 IU/L (ref 13–60)
LYMPHOCYTES ABSOLUTE: 2.7 K/CU MM
LYMPHOCYTES RELATIVE PERCENT: 38.3 % (ref 24–44)
MAGNESIUM: 1.7 MG/DL (ref 1.8–2.4)
MCH RBC QN AUTO: 29.3 PG (ref 27–31)
MCHC RBC AUTO-ENTMCNC: 32.7 % (ref 32–36)
MCV RBC AUTO: 89.6 FL (ref 78–100)
MONOCYTES ABSOLUTE: 0.9 K/CU MM
MONOCYTES RELATIVE PERCENT: 12.4 % (ref 0–4)
NITRITE URINE, QUANTITATIVE: NEGATIVE
NUCLEATED RBC %: 0 %
PDW BLD-RTO: 13.7 % (ref 11.7–14.9)
PH, URINE: 8 (ref 5–8)
PLATELET # BLD: 226 K/CU MM (ref 140–440)
PMV BLD AUTO: 9.7 FL (ref 7.5–11.1)
POTASSIUM SERPL-SCNC: 3 MMOL/L (ref 3.5–5.1)
PRO-BNP: 478.5 PG/ML
PROTEIN UA: NEGATIVE MG/DL
RBC # BLD: 4.54 M/CU MM (ref 4.6–6.2)
RBC URINE: ABNORMAL /HPF (ref 0–3)
SARS-COV-2, NAAT: NOT DETECTED
SEGMENTED NEUTROPHILS ABSOLUTE COUNT: 3.1 K/CU MM
SEGMENTED NEUTROPHILS RELATIVE PERCENT: 44.8 % (ref 36–66)
SODIUM BLD-SCNC: 137 MMOL/L (ref 135–145)
SOURCE: NORMAL
SPECIFIC GRAVITY UA: 1.06 (ref 1–1.03)
TOTAL IMMATURE NEUTOROPHIL: 0.02 K/CU MM
TOTAL NUCLEATED RBC: 0 K/CU MM
TOTAL PROTEIN: 6.2 GM/DL (ref 6.4–8.2)
TRICHOMONAS: ABNORMAL /HPF
TROPONIN T: <0.01 NG/ML
UROBILINOGEN, URINE: 2 MG/DL (ref 0.2–1)
WBC # BLD: 7 K/CU MM (ref 4–10.5)
WBC UA: <1 /HPF (ref 0–2)

## 2021-03-26 PROCEDURE — 96366 THER/PROPH/DIAG IV INF ADDON: CPT

## 2021-03-26 PROCEDURE — 6360000004 HC RX CONTRAST MEDICATION: Performed by: PHYSICIAN ASSISTANT

## 2021-03-26 PROCEDURE — 96368 THER/DIAG CONCURRENT INF: CPT

## 2021-03-26 PROCEDURE — 74177 CT ABD & PELVIS W/CONTRAST: CPT

## 2021-03-26 PROCEDURE — 93005 ELECTROCARDIOGRAM TRACING: CPT | Performed by: PHYSICIAN ASSISTANT

## 2021-03-26 PROCEDURE — 83880 ASSAY OF NATRIURETIC PEPTIDE: CPT

## 2021-03-26 PROCEDURE — 85025 COMPLETE CBC W/AUTO DIFF WBC: CPT

## 2021-03-26 PROCEDURE — 81001 URINALYSIS AUTO W/SCOPE: CPT

## 2021-03-26 PROCEDURE — 83690 ASSAY OF LIPASE: CPT

## 2021-03-26 PROCEDURE — 83735 ASSAY OF MAGNESIUM: CPT

## 2021-03-26 PROCEDURE — 96375 TX/PRO/DX INJ NEW DRUG ADDON: CPT

## 2021-03-26 PROCEDURE — 96365 THER/PROPH/DIAG IV INF INIT: CPT

## 2021-03-26 PROCEDURE — 96367 TX/PROPH/DG ADDL SEQ IV INF: CPT

## 2021-03-26 PROCEDURE — 80053 COMPREHEN METABOLIC PANEL: CPT

## 2021-03-26 PROCEDURE — 71045 X-RAY EXAM CHEST 1 VIEW: CPT

## 2021-03-26 PROCEDURE — 87635 SARS-COV-2 COVID-19 AMP PRB: CPT

## 2021-03-26 PROCEDURE — 76705 ECHO EXAM OF ABDOMEN: CPT

## 2021-03-26 PROCEDURE — 80307 DRUG TEST PRSMV CHEM ANLYZR: CPT

## 2021-03-26 PROCEDURE — 2580000003 HC RX 258: Performed by: PHYSICIAN ASSISTANT

## 2021-03-26 PROCEDURE — 99285 EMERGENCY DEPT VISIT HI MDM: CPT

## 2021-03-26 PROCEDURE — 36415 COLL VENOUS BLD VENIPUNCTURE: CPT

## 2021-03-26 PROCEDURE — 84484 ASSAY OF TROPONIN QUANT: CPT

## 2021-03-26 PROCEDURE — 6360000002 HC RX W HCPCS: Performed by: PHYSICIAN ASSISTANT

## 2021-03-26 RX ORDER — POTASSIUM CHLORIDE 7.45 MG/ML
40 INJECTION INTRAVENOUS ONCE
Status: DISCONTINUED | OUTPATIENT
Start: 2021-03-26 | End: 2021-03-26 | Stop reason: CLARIF

## 2021-03-26 RX ORDER — POTASSIUM CHLORIDE 7.45 MG/ML
10 INJECTION INTRAVENOUS ONCE
Status: COMPLETED | OUTPATIENT
Start: 2021-03-26 | End: 2021-03-27

## 2021-03-26 RX ORDER — SODIUM CHLORIDE 0.9 % (FLUSH) 0.9 %
10 SYRINGE (ML) INJECTION 2 TIMES DAILY
Status: DISCONTINUED | OUTPATIENT
Start: 2021-03-26 | End: 2021-03-27

## 2021-03-26 RX ORDER — POTASSIUM CHLORIDE 7.45 MG/ML
10 INJECTION INTRAVENOUS ONCE
Status: COMPLETED | OUTPATIENT
Start: 2021-03-27 | End: 2021-03-27

## 2021-03-26 RX ORDER — ONDANSETRON 2 MG/ML
4 INJECTION INTRAMUSCULAR; INTRAVENOUS ONCE
Status: COMPLETED | OUTPATIENT
Start: 2021-03-26 | End: 2021-03-26

## 2021-03-26 RX ORDER — SODIUM CHLORIDE 9 MG/ML
INJECTION, SOLUTION INTRAVENOUS CONTINUOUS
Status: DISCONTINUED | OUTPATIENT
Start: 2021-03-26 | End: 2021-03-27

## 2021-03-26 RX ORDER — KETOROLAC TROMETHAMINE 30 MG/ML
30 INJECTION, SOLUTION INTRAMUSCULAR; INTRAVENOUS ONCE
Status: COMPLETED | OUTPATIENT
Start: 2021-03-26 | End: 2021-03-26

## 2021-03-26 RX ORDER — 0.9 % SODIUM CHLORIDE 0.9 %
1000 INTRAVENOUS SOLUTION INTRAVENOUS ONCE
Status: COMPLETED | OUTPATIENT
Start: 2021-03-26 | End: 2021-03-26

## 2021-03-26 RX ADMIN — SODIUM CHLORIDE 1000 ML: 9 INJECTION, SOLUTION INTRAVENOUS at 21:04

## 2021-03-26 RX ADMIN — SODIUM CHLORIDE, PRESERVATIVE FREE 10 ML: 5 INJECTION INTRAVENOUS at 22:25

## 2021-03-26 RX ADMIN — IOPAMIDOL 80 ML: 755 INJECTION, SOLUTION INTRAVENOUS at 22:25

## 2021-03-26 RX ADMIN — ONDANSETRON 4 MG: 2 INJECTION INTRAMUSCULAR; INTRAVENOUS at 21:04

## 2021-03-26 RX ADMIN — CEFTRIAXONE 1000 MG: 1 INJECTION, POWDER, FOR SOLUTION INTRAMUSCULAR; INTRAVENOUS at 21:46

## 2021-03-26 RX ADMIN — AZITHROMYCIN MONOHYDRATE 500 MG: 500 INJECTION, POWDER, LYOPHILIZED, FOR SOLUTION INTRAVENOUS at 23:00

## 2021-03-26 RX ADMIN — KETOROLAC TROMETHAMINE 30 MG: 30 INJECTION, SOLUTION INTRAMUSCULAR; INTRAVENOUS at 21:04

## 2021-03-26 RX ADMIN — SODIUM CHLORIDE: 9 INJECTION, SOLUTION INTRAVENOUS at 23:28

## 2021-03-26 RX ADMIN — POTASSIUM CHLORIDE 10 MEQ: 7.46 INJECTION, SOLUTION INTRAVENOUS at 22:37

## 2021-03-26 ASSESSMENT — PAIN SCALES - GENERAL: PAINLEVEL_OUTOF10: 5

## 2021-03-26 ASSESSMENT — PAIN DESCRIPTION - LOCATION: LOCATION: GENERALIZED

## 2021-03-26 NOTE — Clinical Note
Patient Class: Inpatient [101]   REQUIRED: Diagnosis: Nausea and vomiting, intractability of vomiting not specified, unspecified vomiting type [[id number]]   Estimated Length of Stay: Estimated stay of more than 2 midnights

## 2021-03-27 ENCOUNTER — APPOINTMENT (OUTPATIENT)
Dept: MRI IMAGING | Age: 56
DRG: 263 | End: 2021-03-27
Payer: COMMERCIAL

## 2021-03-27 ENCOUNTER — ANESTHESIA EVENT (OUTPATIENT)
Dept: OPERATING ROOM | Age: 56
DRG: 263 | End: 2021-03-27
Payer: COMMERCIAL

## 2021-03-27 ENCOUNTER — ANESTHESIA (OUTPATIENT)
Dept: OPERATING ROOM | Age: 56
DRG: 263 | End: 2021-03-27
Payer: COMMERCIAL

## 2021-03-27 VITALS
OXYGEN SATURATION: 100 % | DIASTOLIC BLOOD PRESSURE: 93 MMHG | TEMPERATURE: 97.9 F | SYSTOLIC BLOOD PRESSURE: 150 MMHG | RESPIRATION RATE: 18 BRPM

## 2021-03-27 PROBLEM — R11.2 NAUSEA AND VOMITING: Status: ACTIVE | Noted: 2021-03-27

## 2021-03-27 PROBLEM — R74.01 TRANSAMINITIS: Status: ACTIVE | Noted: 2021-03-27

## 2021-03-27 LAB
ACETAMINOPHEN LEVEL: <5 UG/ML (ref 15–30)
ADENOVIRUS DETECTION BY PCR: NOT DETECTED
ALBUMIN SERPL-MCNC: 3 GM/DL (ref 3.4–5)
ALBUMIN SERPL-MCNC: 3.1 GM/DL (ref 3.4–5)
ALCOHOL SCREEN SERUM: <0.01 %WT/VOL
ALP BLD-CCNC: 393 IU/L (ref 40–129)
ALP BLD-CCNC: 414 IU/L (ref 40–128)
ALT SERPL-CCNC: 829 U/L (ref 10–40)
ALT SERPL-CCNC: 867 U/L (ref 10–40)
AMPHETAMINES: ABNORMAL
ANION GAP SERPL CALCULATED.3IONS-SCNC: 9 MMOL/L (ref 4–16)
APTT: 27.8 SECONDS (ref 25.1–37.1)
AST SERPL-CCNC: 694 IU/L (ref 15–37)
AST SERPL-CCNC: 761 IU/L (ref 15–37)
BARBITURATE SCREEN URINE: NEGATIVE
BASOPHILS ABSOLUTE: 0.1 K/CU MM
BASOPHILS RELATIVE PERCENT: 1.1 % (ref 0–1)
BENZODIAZEPINE SCREEN, URINE: NEGATIVE
BILIRUB SERPL-MCNC: 2.1 MG/DL (ref 0–1)
BILIRUB SERPL-MCNC: 2.5 MG/DL (ref 0–1)
BILIRUBIN DIRECT: 1.8 MG/DL (ref 0–0.3)
BILIRUBIN, INDIRECT: 0.3 MG/DL (ref 0–0.7)
BORDETELLA PARAPERTUSSIS BY PCR: NOT DETECTED
BORDETELLA PERTUSSIS PCR: NOT DETECTED
BUN BLDV-MCNC: 12 MG/DL (ref 6–23)
CALCIUM SERPL-MCNC: 8.1 MG/DL (ref 8.3–10.6)
CANNABINOID SCREEN URINE: ABNORMAL
CHLAMYDOPHILA PNEUMONIA PCR: NOT DETECTED
CHLORIDE BLD-SCNC: 102 MMOL/L (ref 99–110)
CO2: 24 MMOL/L (ref 21–32)
COCAINE METABOLITE: NEGATIVE
CORONAVIRUS 229E PCR: NOT DETECTED
CORONAVIRUS HKU1 PCR: NOT DETECTED
CORONAVIRUS NL63 PCR: NOT DETECTED
CORONAVIRUS OC43 PCR: NOT DETECTED
CREAT SERPL-MCNC: 0.6 MG/DL (ref 0.9–1.3)
DIFFERENTIAL TYPE: ABNORMAL
DOSE AMOUNT: ABNORMAL
DOSE AMOUNT: ABNORMAL
DOSE TIME: ABNORMAL
DOSE TIME: ABNORMAL
EOSINOPHILS ABSOLUTE: 0.2 K/CU MM
EOSINOPHILS RELATIVE PERCENT: 2.6 % (ref 0–3)
GFR AFRICAN AMERICAN: >60 ML/MIN/1.73M2
GFR NON-AFRICAN AMERICAN: >60 ML/MIN/1.73M2
GLUCOSE BLD-MCNC: 109 MG/DL (ref 70–99)
HAV IGM SER IA-ACNC: NON REACTIVE
HCT VFR BLD CALC: 42.1 % (ref 42–52)
HEMOGLOBIN: 13.8 GM/DL (ref 13.5–18)
HEPATITIS B CORE IGM ANTIBODY: NON REACTIVE
HEPATITIS B SURFACE ANTIGEN: NON REACTIVE
HEPATITIS C ANTIBODY: ABNORMAL
HUMAN METAPNEUMOVIRUS PCR: NOT DETECTED
IMMATURE NEUTROPHIL %: 0.3 % (ref 0–0.43)
INFLUENZA A BY PCR: NOT DETECTED
INFLUENZA A H1 (2009) PCR: NOT DETECTED
INFLUENZA A H1 PANDEMIC PCR: NOT DETECTED
INFLUENZA A H3 PCR: NOT DETECTED
INFLUENZA B BY PCR: NOT DETECTED
INR BLD: 1.11 INDEX
LYMPHOCYTES ABSOLUTE: 2.7 K/CU MM
LYMPHOCYTES RELATIVE PERCENT: 40.1 % (ref 24–44)
MAGNESIUM: 1.9 MG/DL (ref 1.8–2.4)
MCH RBC QN AUTO: 30.1 PG (ref 27–31)
MCHC RBC AUTO-ENTMCNC: 32.8 % (ref 32–36)
MCV RBC AUTO: 91.7 FL (ref 78–100)
MONOCYTES ABSOLUTE: 0.8 K/CU MM
MONOCYTES RELATIVE PERCENT: 12.1 % (ref 0–4)
MYCOPLASMA PNEUMONIAE PCR: NOT DETECTED
NUCLEATED RBC %: 0 %
OPIATES, URINE: NEGATIVE
OXYCODONE: NEGATIVE
PARAINFLUENZA 1 PCR: NOT DETECTED
PARAINFLUENZA 2 PCR: NOT DETECTED
PARAINFLUENZA 3 PCR: NOT DETECTED
PARAINFLUENZA 4 PCR: NOT DETECTED
PDW BLD-RTO: 14.1 % (ref 11.7–14.9)
PHENCYCLIDINE, URINE: NEGATIVE
PLATELET # BLD: 208 K/CU MM (ref 140–440)
PMV BLD AUTO: 9.6 FL (ref 7.5–11.1)
POTASSIUM SERPL-SCNC: 3.3 MMOL/L (ref 3.5–5.1)
PROTHROMBIN TIME: 13.4 SECONDS (ref 11.7–14.5)
RBC # BLD: 4.59 M/CU MM (ref 4.6–6.2)
RHINOVIRUS ENTEROVIRUS PCR: NOT DETECTED
RSV PCR: NOT DETECTED
SALICYLATE LEVEL: <0.3 MG/DL (ref 15–30)
SARS-COV-2: NOT DETECTED
SEGMENTED NEUTROPHILS ABSOLUTE COUNT: 2.9 K/CU MM
SEGMENTED NEUTROPHILS RELATIVE PERCENT: 43.8 % (ref 36–66)
SODIUM BLD-SCNC: 135 MMOL/L (ref 135–145)
TOTAL IMMATURE NEUTOROPHIL: 0.02 K/CU MM
TOTAL NUCLEATED RBC: 0 K/CU MM
TOTAL PROTEIN: 6.1 GM/DL (ref 6.4–8.2)
TOTAL PROTEIN: 6.2 GM/DL (ref 6.4–8.2)
WBC # BLD: 6.6 K/CU MM (ref 4–10.5)

## 2021-03-27 PROCEDURE — 74183 MRI ABD W/O CNTR FLWD CNTR: CPT

## 2021-03-27 PROCEDURE — 99221 1ST HOSP IP/OBS SF/LOW 40: CPT | Performed by: SURGERY

## 2021-03-27 PROCEDURE — 47562 LAPAROSCOPIC CHOLECYSTECTOMY: CPT | Performed by: SURGERY

## 2021-03-27 PROCEDURE — 6360000002 HC RX W HCPCS: Performed by: NURSE ANESTHETIST, CERTIFIED REGISTERED

## 2021-03-27 PROCEDURE — 94761 N-INVAS EAR/PLS OXIMETRY MLT: CPT

## 2021-03-27 PROCEDURE — 7100000001 HC PACU RECOVERY - ADDTL 15 MIN: Performed by: SURGERY

## 2021-03-27 PROCEDURE — 2500000003 HC RX 250 WO HCPCS: Performed by: SURGERY

## 2021-03-27 PROCEDURE — 1200000000 HC SEMI PRIVATE

## 2021-03-27 PROCEDURE — 85730 THROMBOPLASTIN TIME PARTIAL: CPT

## 2021-03-27 PROCEDURE — 0202U NFCT DS 22 TRGT SARS-COV-2: CPT

## 2021-03-27 PROCEDURE — 80076 HEPATIC FUNCTION PANEL: CPT

## 2021-03-27 PROCEDURE — 6360000004 HC RX CONTRAST MEDICATION: Performed by: SURGERY

## 2021-03-27 PROCEDURE — 7100000000 HC PACU RECOVERY - FIRST 15 MIN: Performed by: SURGERY

## 2021-03-27 PROCEDURE — 36415 COLL VENOUS BLD VENIPUNCTURE: CPT

## 2021-03-27 PROCEDURE — 0FT44ZZ RESECTION OF GALLBLADDER, PERCUTANEOUS ENDOSCOPIC APPROACH: ICD-10-PCS | Performed by: SURGERY

## 2021-03-27 PROCEDURE — 2700000000 HC OXYGEN THERAPY PER DAY

## 2021-03-27 PROCEDURE — 2580000003 HC RX 258: Performed by: NURSE ANESTHETIST, CERTIFIED REGISTERED

## 2021-03-27 PROCEDURE — G0480 DRUG TEST DEF 1-7 CLASSES: HCPCS

## 2021-03-27 PROCEDURE — 2709999900 HC NON-CHARGEABLE SUPPLY: Performed by: SURGERY

## 2021-03-27 PROCEDURE — 6360000002 HC RX W HCPCS: Performed by: STUDENT IN AN ORGANIZED HEALTH CARE EDUCATION/TRAINING PROGRAM

## 2021-03-27 PROCEDURE — 80074 ACUTE HEPATITIS PANEL: CPT

## 2021-03-27 PROCEDURE — 93010 ELECTROCARDIOGRAM REPORT: CPT | Performed by: INTERNAL MEDICINE

## 2021-03-27 PROCEDURE — 2580000003 HC RX 258: Performed by: SURGERY

## 2021-03-27 PROCEDURE — 2500000003 HC RX 250 WO HCPCS: Performed by: STUDENT IN AN ORGANIZED HEALTH CARE EDUCATION/TRAINING PROGRAM

## 2021-03-27 PROCEDURE — 3600000014 HC SURGERY LEVEL 4 ADDTL 15MIN: Performed by: SURGERY

## 2021-03-27 PROCEDURE — 6360000002 HC RX W HCPCS: Performed by: NURSE PRACTITIONER

## 2021-03-27 PROCEDURE — 2500000003 HC RX 250 WO HCPCS: Performed by: NURSE ANESTHETIST, CERTIFIED REGISTERED

## 2021-03-27 PROCEDURE — 83735 ASSAY OF MAGNESIUM: CPT

## 2021-03-27 PROCEDURE — A9577 INJ MULTIHANCE: HCPCS | Performed by: SURGERY

## 2021-03-27 PROCEDURE — 88304 TISSUE EXAM BY PATHOLOGIST: CPT | Performed by: PATHOLOGY

## 2021-03-27 PROCEDURE — 6360000002 HC RX W HCPCS: Performed by: SURGERY

## 2021-03-27 PROCEDURE — 6360000002 HC RX W HCPCS: Performed by: PHYSICIAN ASSISTANT

## 2021-03-27 PROCEDURE — 3700000001 HC ADD 15 MINUTES (ANESTHESIA): Performed by: SURGERY

## 2021-03-27 PROCEDURE — 80053 COMPREHEN METABOLIC PANEL: CPT

## 2021-03-27 PROCEDURE — 3700000000 HC ANESTHESIA ATTENDED CARE: Performed by: SURGERY

## 2021-03-27 PROCEDURE — 85610 PROTHROMBIN TIME: CPT

## 2021-03-27 PROCEDURE — 85025 COMPLETE CBC W/AUTO DIFF WBC: CPT

## 2021-03-27 PROCEDURE — 3600000004 HC SURGERY LEVEL 4 BASE: Performed by: SURGERY

## 2021-03-27 RX ORDER — SODIUM CHLORIDE 0.9 % (FLUSH) 0.9 %
10 SYRINGE (ML) INJECTION PRN
Status: DISCONTINUED | OUTPATIENT
Start: 2021-03-27 | End: 2021-03-29 | Stop reason: HOSPADM

## 2021-03-27 RX ORDER — SODIUM CHLORIDE 9 MG/ML
25 INJECTION, SOLUTION INTRAVENOUS PRN
Status: DISCONTINUED | OUTPATIENT
Start: 2021-03-27 | End: 2021-03-29 | Stop reason: HOSPADM

## 2021-03-27 RX ORDER — SODIUM CHLORIDE 0.9 % (FLUSH) 0.9 %
10 SYRINGE (ML) INJECTION EVERY 12 HOURS SCHEDULED
Status: DISCONTINUED | OUTPATIENT
Start: 2021-03-27 | End: 2021-03-29 | Stop reason: HOSPADM

## 2021-03-27 RX ORDER — HYDRALAZINE HYDROCHLORIDE 20 MG/ML
5 INJECTION INTRAMUSCULAR; INTRAVENOUS EVERY 10 MIN PRN
Status: DISCONTINUED | OUTPATIENT
Start: 2021-03-27 | End: 2021-03-27 | Stop reason: HOSPADM

## 2021-03-27 RX ORDER — HYDROMORPHONE HCL 110MG/55ML
0.5 PATIENT CONTROLLED ANALGESIA SYRINGE INTRAVENOUS EVERY 5 MIN PRN
Status: DISCONTINUED | OUTPATIENT
Start: 2021-03-27 | End: 2021-03-27 | Stop reason: HOSPADM

## 2021-03-27 RX ORDER — LABETALOL HYDROCHLORIDE 5 MG/ML
INJECTION, SOLUTION INTRAVENOUS PRN
Status: DISCONTINUED | OUTPATIENT
Start: 2021-03-27 | End: 2021-03-27 | Stop reason: SDUPTHER

## 2021-03-27 RX ORDER — MAGNESIUM SULFATE 1 G/100ML
1000 INJECTION INTRAVENOUS ONCE
Status: COMPLETED | OUTPATIENT
Start: 2021-03-27 | End: 2021-03-27

## 2021-03-27 RX ORDER — DIPHENHYDRAMINE HYDROCHLORIDE 50 MG/ML
12.5 INJECTION INTRAMUSCULAR; INTRAVENOUS
Status: DISCONTINUED | OUTPATIENT
Start: 2021-03-27 | End: 2021-03-27 | Stop reason: HOSPADM

## 2021-03-27 RX ORDER — LIDOCAINE HYDROCHLORIDE 20 MG/ML
INJECTION, SOLUTION INTRAVENOUS PRN
Status: DISCONTINUED | OUTPATIENT
Start: 2021-03-27 | End: 2021-03-27 | Stop reason: SDUPTHER

## 2021-03-27 RX ORDER — PROPOFOL 10 MG/ML
INJECTION, EMULSION INTRAVENOUS PRN
Status: DISCONTINUED | OUTPATIENT
Start: 2021-03-27 | End: 2021-03-27 | Stop reason: SDUPTHER

## 2021-03-27 RX ORDER — ONDANSETRON 2 MG/ML
4 INJECTION INTRAMUSCULAR; INTRAVENOUS EVERY 6 HOURS PRN
Status: DISCONTINUED | OUTPATIENT
Start: 2021-03-27 | End: 2021-03-29 | Stop reason: HOSPADM

## 2021-03-27 RX ORDER — ROCURONIUM BROMIDE 10 MG/ML
INJECTION, SOLUTION INTRAVENOUS PRN
Status: DISCONTINUED | OUTPATIENT
Start: 2021-03-27 | End: 2021-03-27 | Stop reason: SDUPTHER

## 2021-03-27 RX ORDER — HYDROCODONE BITARTRATE AND ACETAMINOPHEN 5; 325 MG/1; MG/1
2 TABLET ORAL PRN
Status: DISCONTINUED | OUTPATIENT
Start: 2021-03-27 | End: 2021-03-27 | Stop reason: HOSPADM

## 2021-03-27 RX ORDER — SODIUM CHLORIDE, SODIUM LACTATE, POTASSIUM CHLORIDE, CALCIUM CHLORIDE 600; 310; 30; 20 MG/100ML; MG/100ML; MG/100ML; MG/100ML
INJECTION, SOLUTION INTRAVENOUS CONTINUOUS PRN
Status: DISCONTINUED | OUTPATIENT
Start: 2021-03-27 | End: 2021-03-27 | Stop reason: SDUPTHER

## 2021-03-27 RX ORDER — ONDANSETRON 2 MG/ML
INJECTION INTRAMUSCULAR; INTRAVENOUS PRN
Status: DISCONTINUED | OUTPATIENT
Start: 2021-03-27 | End: 2021-03-27 | Stop reason: SDUPTHER

## 2021-03-27 RX ORDER — FENTANYL CITRATE 50 UG/ML
50 INJECTION, SOLUTION INTRAMUSCULAR; INTRAVENOUS EVERY 5 MIN PRN
Status: DISCONTINUED | OUTPATIENT
Start: 2021-03-27 | End: 2021-03-27 | Stop reason: HOSPADM

## 2021-03-27 RX ORDER — FENTANYL CITRATE 50 UG/ML
INJECTION, SOLUTION INTRAMUSCULAR; INTRAVENOUS PRN
Status: DISCONTINUED | OUTPATIENT
Start: 2021-03-27 | End: 2021-03-27 | Stop reason: SDUPTHER

## 2021-03-27 RX ORDER — LABETALOL HYDROCHLORIDE 5 MG/ML
5 INJECTION, SOLUTION INTRAVENOUS EVERY 10 MIN PRN
Status: DISCONTINUED | OUTPATIENT
Start: 2021-03-27 | End: 2021-03-27 | Stop reason: HOSPADM

## 2021-03-27 RX ORDER — BUPIVACAINE HYDROCHLORIDE 5 MG/ML
INJECTION, SOLUTION EPIDURAL; INTRACAUDAL
Status: COMPLETED | OUTPATIENT
Start: 2021-03-27 | End: 2021-03-27

## 2021-03-27 RX ORDER — PROMETHAZINE HYDROCHLORIDE 25 MG/ML
6.25 INJECTION, SOLUTION INTRAMUSCULAR; INTRAVENOUS
Status: DISCONTINUED | OUTPATIENT
Start: 2021-03-27 | End: 2021-03-27 | Stop reason: HOSPADM

## 2021-03-27 RX ORDER — SODIUM CHLORIDE 9 MG/ML
INJECTION, SOLUTION INTRAVENOUS CONTINUOUS
Status: DISCONTINUED | OUTPATIENT
Start: 2021-03-27 | End: 2021-03-28

## 2021-03-27 RX ORDER — PROMETHAZINE HYDROCHLORIDE 25 MG/1
12.5 TABLET ORAL EVERY 6 HOURS PRN
Status: DISCONTINUED | OUTPATIENT
Start: 2021-03-27 | End: 2021-03-29 | Stop reason: HOSPADM

## 2021-03-27 RX ORDER — MEPERIDINE HYDROCHLORIDE 25 MG/ML
12.5 INJECTION INTRAMUSCULAR; INTRAVENOUS; SUBCUTANEOUS EVERY 5 MIN PRN
Status: DISCONTINUED | OUTPATIENT
Start: 2021-03-27 | End: 2021-03-27 | Stop reason: HOSPADM

## 2021-03-27 RX ORDER — DEXAMETHASONE SODIUM PHOSPHATE 4 MG/ML
INJECTION, SOLUTION INTRA-ARTICULAR; INTRALESIONAL; INTRAMUSCULAR; INTRAVENOUS; SOFT TISSUE PRN
Status: DISCONTINUED | OUTPATIENT
Start: 2021-03-27 | End: 2021-03-27 | Stop reason: SDUPTHER

## 2021-03-27 RX ORDER — LEVOFLOXACIN 5 MG/ML
750 INJECTION, SOLUTION INTRAVENOUS EVERY 24 HOURS
Status: DISCONTINUED | OUTPATIENT
Start: 2021-03-27 | End: 2021-03-29 | Stop reason: HOSPADM

## 2021-03-27 RX ORDER — HYDROCODONE BITARTRATE AND ACETAMINOPHEN 5; 325 MG/1; MG/1
1 TABLET ORAL PRN
Status: DISCONTINUED | OUTPATIENT
Start: 2021-03-27 | End: 2021-03-27 | Stop reason: HOSPADM

## 2021-03-27 RX ORDER — CIPROFLOXACIN 2 MG/ML
400 INJECTION, SOLUTION INTRAVENOUS
Status: COMPLETED | OUTPATIENT
Start: 2021-03-27 | End: 2021-03-27

## 2021-03-27 RX ORDER — KETOROLAC TROMETHAMINE 30 MG/ML
15 INJECTION, SOLUTION INTRAMUSCULAR; INTRAVENOUS EVERY 6 HOURS PRN
Status: DISCONTINUED | OUTPATIENT
Start: 2021-03-27 | End: 2021-03-29 | Stop reason: HOSPADM

## 2021-03-27 RX ADMIN — METRONIDAZOLE 500 MG: 500 INJECTION, SOLUTION INTRAVENOUS at 05:18

## 2021-03-27 RX ADMIN — GADOBENATE DIMEGLUMINE 20 ML: 529 INJECTION, SOLUTION INTRAVENOUS at 10:15

## 2021-03-27 RX ADMIN — FENTANYL CITRATE 50 MCG: 50 INJECTION, SOLUTION INTRAMUSCULAR; INTRAVENOUS at 16:09

## 2021-03-27 RX ADMIN — PROPOFOL 20 MG: 10 INJECTION, EMULSION INTRAVENOUS at 15:26

## 2021-03-27 RX ADMIN — LABETALOL HYDROCHLORIDE 5 MG: 5 INJECTION, SOLUTION INTRAVENOUS at 15:26

## 2021-03-27 RX ADMIN — LIDOCAINE HYDROCHLORIDE 100 MG: 20 INJECTION, SOLUTION INTRAVENOUS at 15:10

## 2021-03-27 RX ADMIN — FENTANYL CITRATE 50 MCG: 50 INJECTION, SOLUTION INTRAMUSCULAR; INTRAVENOUS at 15:24

## 2021-03-27 RX ADMIN — SODIUM CHLORIDE: 9 INJECTION, SOLUTION INTRAVENOUS at 23:34

## 2021-03-27 RX ADMIN — FENTANYL CITRATE 100 MCG: 50 INJECTION, SOLUTION INTRAMUSCULAR; INTRAVENOUS at 15:01

## 2021-03-27 RX ADMIN — DEXAMETHASONE SODIUM PHOSPHATE 4 MG: 4 INJECTION, SOLUTION INTRAMUSCULAR; INTRAVENOUS at 15:22

## 2021-03-27 RX ADMIN — KETOROLAC TROMETHAMINE 15 MG: 30 INJECTION, SOLUTION INTRAMUSCULAR; INTRAVENOUS at 21:02

## 2021-03-27 RX ADMIN — ROCURONIUM BROMIDE 10 MG: 10 INJECTION INTRAVENOUS at 15:55

## 2021-03-27 RX ADMIN — ONDANSETRON 4 MG: 2 INJECTION INTRAMUSCULAR; INTRAVENOUS at 15:22

## 2021-03-27 RX ADMIN — POTASSIUM CHLORIDE 10 MEQ: 7.46 INJECTION, SOLUTION INTRAVENOUS at 02:30

## 2021-03-27 RX ADMIN — ONDANSETRON 4 MG: 2 INJECTION INTRAMUSCULAR; INTRAVENOUS at 17:08

## 2021-03-27 RX ADMIN — ROCURONIUM BROMIDE 50 MG: 10 INJECTION INTRAVENOUS at 15:07

## 2021-03-27 RX ADMIN — METRONIDAZOLE 500 MG: 500 INJECTION, SOLUTION INTRAVENOUS at 20:33

## 2021-03-27 RX ADMIN — MAGNESIUM SULFATE HEPTAHYDRATE 1000 MG: 1 INJECTION, SOLUTION INTRAVENOUS at 02:30

## 2021-03-27 RX ADMIN — FENTANYL CITRATE 50 MCG: 50 INJECTION, SOLUTION INTRAMUSCULAR; INTRAVENOUS at 15:17

## 2021-03-27 RX ADMIN — SODIUM CHLORIDE, POTASSIUM CHLORIDE, SODIUM LACTATE AND CALCIUM CHLORIDE: 600; 310; 30; 20 INJECTION, SOLUTION INTRAVENOUS at 15:01

## 2021-03-27 RX ADMIN — METRONIDAZOLE 500 MG: 500 INJECTION, SOLUTION INTRAVENOUS at 15:21

## 2021-03-27 RX ADMIN — POTASSIUM CHLORIDE 10 MEQ: 7.46 INJECTION, SOLUTION INTRAVENOUS at 00:08

## 2021-03-27 RX ADMIN — POTASSIUM CHLORIDE 10 MEQ: 7.46 INJECTION, SOLUTION INTRAVENOUS at 01:14

## 2021-03-27 RX ADMIN — METRONIDAZOLE 500 MG: 500 INJECTION, SOLUTION INTRAVENOUS at 16:05

## 2021-03-27 RX ADMIN — CIPROFLOXACIN 400 MG: 2 INJECTION, SOLUTION INTRAVENOUS at 15:12

## 2021-03-27 RX ADMIN — SUGAMMADEX 200 MG: 100 INJECTION, SOLUTION INTRAVENOUS at 16:03

## 2021-03-27 RX ADMIN — PROPOFOL 200 MG: 10 INJECTION, EMULSION INTRAVENOUS at 15:10

## 2021-03-27 RX ADMIN — LEVOFLOXACIN 750 MG: 5 INJECTION, SOLUTION INTRAVENOUS at 06:32

## 2021-03-27 RX ADMIN — SODIUM CHLORIDE: 9 INJECTION, SOLUTION INTRAVENOUS at 10:02

## 2021-03-27 ASSESSMENT — PULMONARY FUNCTION TESTS
PIF_VALUE: 22
PIF_VALUE: 15
PIF_VALUE: 22
PIF_VALUE: 23
PIF_VALUE: 23
PIF_VALUE: 14
PIF_VALUE: 15
PIF_VALUE: 13
PIF_VALUE: 21
PIF_VALUE: 23
PIF_VALUE: 23
PIF_VALUE: 42
PIF_VALUE: 22
PIF_VALUE: 15
PIF_VALUE: 23
PIF_VALUE: 0
PIF_VALUE: 23
PIF_VALUE: 15
PIF_VALUE: 22
PIF_VALUE: 5
PIF_VALUE: 23
PIF_VALUE: 1
PIF_VALUE: 22
PIF_VALUE: 20
PIF_VALUE: 14
PIF_VALUE: 16
PIF_VALUE: 0
PIF_VALUE: 17
PIF_VALUE: 23
PIF_VALUE: 3
PIF_VALUE: 10
PIF_VALUE: 15
PIF_VALUE: 23
PIF_VALUE: 8
PIF_VALUE: 23
PIF_VALUE: 22
PIF_VALUE: 15
PIF_VALUE: 14
PIF_VALUE: 15
PIF_VALUE: 22
PIF_VALUE: 2

## 2021-03-27 ASSESSMENT — PAIN SCALES - GENERAL
PAINLEVEL_OUTOF10: 0

## 2021-03-27 ASSESSMENT — LIFESTYLE VARIABLES: SMOKING_STATUS: 1

## 2021-03-27 NOTE — ED NOTES
Upon evaluation of the client, they are observed to be alert and oriented to person, place and situation. The head of the bed is elevated above 30 degrees. The client verbalizes appropriately to all questions and/or comments. The client also makes eye contact when prompted. The client also exhibits unlabored breathing, their skin is pink, warm & dry, and are observed to have relaxed extremities. When communicating, the client speaks with clear speech and normal tone and is in no apparent distress. The call light is within reach, the bed is in the low position and both side rails are up.      Bhavesh Jackson RN  03/26/21 0533

## 2021-03-27 NOTE — ANESTHESIA POSTPROCEDURE EVALUATION
Department of Anesthesiology  Postprocedure Note    Patient: Yanira Serrano  MRN: [id number]  YOB: 1965  Date of evaluation: 3/27/2021  Time:  4:25 PM     Procedure Summary     Date: 03/27/21 Room / Location: [address]    Anesthesia Start: 1501 Anesthesia Stop: 1622    Procedure: CHOLECYSTECTOMY LAPAROSCOPIC (N/A Abdomen) Diagnosis: (gangrenous gallbladder)    Surgeons: Nora Valladares MD Responsible Provider: Jj Lazo DO    Anesthesia Type: general ASA Status: 2 - Emergent          Anesthesia Type: general    Abrahan Phase I: Abrahan Score: 8    Abrahan Phase II:      Last vitals: Reviewed and per EMR flowsheets.        Anesthesia Post Evaluation    Patient location during evaluation: PACU  Patient participation: complete - patient participated  Level of consciousness: sleepy but conscious  Pain score: 1  Airway patency: patent  Nausea & Vomiting: no nausea and no vomiting  Complications: no  Cardiovascular status: blood pressure returned to baseline  Respiratory status: acceptable  Hydration status: euvolemic

## 2021-03-27 NOTE — ED NOTES
Pt family left for evening, pt requested that his sister Tram Laird be a alternate point of contact if primary contact does not answer. Her number is [phone].       Concha Hernandez RN  03/26/21 2057

## 2021-03-27 NOTE — PLAN OF CARE
Problem: Infection:  Goal: Will remain free from infection  Description: Will remain free from infection  3/27/2021 1411 by Christian Henson LPN  Outcome: Ongoing     Problem: Safety:  Goal: Free from accidental physical injury  Description: Free from accidental physical injury  3/27/2021 1411 by Christian Henson LPN  Outcome: Ongoing     Problem: Safety:  Goal: Free from intentional harm  Description: Free from intentional harm  3/27/2021 1411 by Christian Henson LPN  Outcome: Ongoing     Problem: Daily Care:  Goal: Daily care needs are met  Description: Daily care needs are met  3/27/2021 1411 by Christian Henson LPN  Outcome: Ongoing     Problem: Pain:  Goal: Patient's pain/discomfort is manageable  Description: Patient's pain/discomfort is manageable  3/27/2021 1411 by Christian Henson LPN  Outcome: Ongoing     Problem: Skin Integrity:  Goal: Skin integrity will stabilize  Description: Skin integrity will stabilize  3/27/2021 1411 by Christian Henson LPN  Outcome: Ongoing     Problem: Discharge Planning:  Goal: Patients continuum of care needs are met  Description: Patients continuum of care needs are met  3/27/2021 1411 by Christian Henson LPN  Outcome: Ongoing

## 2021-03-27 NOTE — ED PROVIDER NOTES
12 lead EKG per my interpretation:  Normal Sinus Rhythm 82  Axis is   Normal  QTc is  497  There is no specific T wave changes appreciated. There is no specific ST wave changes appreciated.     Prior EKG to compare with was not available        Melonie Pizarro DO  03/26/21 7641

## 2021-03-27 NOTE — ED NOTES
Pt diaphoretic, pt alert and oriented, able to have a full conversation with Dr. Treasure Ames. Vitals stable at this time.       Pablo Zayas RN  03/27/21 5691

## 2021-03-27 NOTE — ANESTHESIA PRE PROCEDURE
Department of Anesthesiology  Preprocedure Note       Name:  Sravan Torres   Age:  54 y.o.  :  1965                                          MRN:  [id number]         Date:  3/27/2021      Surgeon: Taz Munoz):  Adolfo Cowan MD    Procedure: Procedure(s):  CHOLECYSTECTOMY LAPAROSCOPIC POSS IOC, POSS OPEN    Medications prior to admission:   Prior to Admission medications    Medication Sig Start Date End Date Taking? Authorizing Provider   ibuprofen (ADVIL;MOTRIN) 600 MG tablet Take 1 tablet by mouth every 6 hours as needed for Pain 12/3/20  Yes Cristy Saha PA-C   albuterol sulfate HFA (PROVENTIL HFA) 108 (90 Base) MCG/ACT inhaler Inhale 2 puffs into the lungs every 4 hours as needed for Wheezing or Shortness of Breath With spacer (and mask if indicated). Thanks.  20  Alem Rojas PA-C       Current medications:    Current Facility-Administered Medications   Medication Dose Route Frequency Provider Last Rate Last Admin    sodium chloride flush 0.9 % injection 10 mL  10 mL Intravenous 2 times per day Levander Oris, DO        sodium chloride flush 0.9 % injection 10 mL  10 mL Intravenous PRN Levander Oris, DO        0.9 % sodium chloride infusion  25 mL Intravenous PRN Levander Oris, DO        promethazine (PHENERGAN) tablet 12.5 mg  12.5 mg Oral Q6H PRN Levander Oris, DO        Or    ondansetron (ZOFRAN) injection 4 mg  4 mg Intravenous Q6H PRN Levander Oris, DO        levoFLOXacin (LEVAQUIN) 750 MG/150ML infusion 750 mg  750 mg Intravenous Q24H Levander Oris, DO   Stopped at 21 0930    metronidazole (FLAGYL) 500 mg in NaCl 100 mL IVPB premix  500 mg Intravenous Q8H Tong Golden DO   Stopped at 21 6337    0.9 % sodium chloride infusion   Intravenous Continuous Adolfo Cowan  mL/hr at 21 1002 New Bag at 21 1002    ciprofloxacin (CIPRO) IVPB 400 mg  400 mg Intravenous On Call to Derrick Nascimento MD        metronidazole (FLAGYL) 500 mg in NaCl 100 mL IVPB premix  500 mg Intravenous On Call to Derrick Nascimento MD           Allergies: Allergies   Allergen Reactions    Bee Venom Anaphylaxis    Pcn [Penicillins] Anaphylaxis       Problem List:    Patient Active Problem List   Diagnosis Code    Nausea and vomiting R11.2    Transaminitis R74.01       Past Medical History:  History reviewed. No pertinent past medical history. Past Surgical History:        Procedure Laterality Date    ARM SURGERY         Social History:    Social History     Tobacco Use    Smoking status: Current Some Day Smoker     Packs/day: 0.50     Types: Cigarettes    Smokeless tobacco: Never Used   Substance Use Topics    Alcohol use: Yes     Comment: occasionally                                 Ready to quit: Not Answered  Counseling given: Not Answered      Vital Signs (Current):   Vitals:    03/27/21 0301 03/27/21 0416 03/27/21 0434 03/27/21 1115   BP: (!) 162/97  (!) 156/97 (!) 148/93   Pulse: 82 83 115 82   Resp: 21 21 21   Temp:  97.8 °F (36.6 °C)  97.7 °F (36.5 °C)   TempSrc:  Oral  Oral   SpO2: 96% 97%  94%   Weight:  206 lb (93.4 kg)     Height:  5' 10\" (1.778 m)                                                BP Readings from Last 3 Encounters:   03/27/21 (!) 148/93   12/03/20 102/70   08/27/20 122/82       NPO Status:                                                                                 BMI:   Wt Readings from Last 3 Encounters:   03/27/21 206 lb (93.4 kg)   12/03/20 200 lb (90.7 kg)   06/10/20 200 lb (90.7 kg)     Body mass index is 29.56 kg/m².     CBC:   Lab Results   Component Value Date    WBC 6.6 03/27/2021    RBC 4.59 03/27/2021    HGB 13.8 03/27/2021    HCT 42.1 03/27/2021    MCV 91.7 03/27/2021    RDW 14.1 03/27/2021     03/27/2021       CMP:   Lab Results   Component Value Date     03/27/2021    K 3.3 03/27/2021     03/27/2021    CO2 24 03/27/2021    BUN 12 03/27/2021    CREATININE 0.6 03/27/2021 GFRAA >60 03/27/2021    LABGLOM >60 03/27/2021    GLUCOSE 109 03/27/2021    PROT 6.1 03/27/2021    CALCIUM 8.1 03/27/2021    BILITOT 2.5 03/27/2021    ALKPHOS 414 03/27/2021     03/27/2021     03/27/2021       POC Tests: No results for input(s): POCGLU, POCNA, POCK, POCCL, POCBUN, POCHEMO, POCHCT in the last 72 hours. Coags:   Lab Results   Component Value Date    PROTIME 13.4 03/27/2021    INR 1.11 03/27/2021    APTT 27.8 03/27/2021       HCG (If Applicable): No results found for: PREGTESTUR, PREGSERUM, HCG, HCGQUANT     ABGs: No results found for: PHART, PO2ART, ITR7VRX, ZGI6KSU, BEART, F3FSONVW     Type & Screen (If Applicable):  No results found for: LABABO, LABRH    Drug/Infectious Status (If Applicable):  Lab Results   Component Value Date    HEPCAB REPEATEDLY REACTIVE 03/27/2021       COVID-19 Screening (If Applicable):   Lab Results   Component Value Date    COVID19 NOT DETECTED 03/27/2021           Anesthesia Evaluation  Patient summary reviewed no history of anesthetic complications:   Airway: Mallampati: II  TM distance: >3 FB     Mouth opening: > = 3 FB Dental:          Pulmonary:   (+) current smoker                           Cardiovascular:Negative CV ROS  Exercise tolerance: good (>4 METS),            Beta Blocker:  Not on Beta Blocker         Neuro/Psych:   Negative Neuro/Psych ROS              GI/Hepatic/Renal: Neg GI/Hepatic/Renal ROS            Endo/Other: Negative Endo/Other ROS                    Abdominal:           Vascular: negative vascular ROS. Anesthesia Plan      general     ASA 2 - emergent       Induction: intravenous. MIPS: Postoperative opioids intended and Prophylactic antiemetics administered. Anesthetic plan and risks discussed with patient. Use of blood products discussed with patient whom. Plan discussed with CRNA and attending.                 Payal Torres DO   3/27/2021

## 2021-03-27 NOTE — CARE COORDINATION
LSW attempted to speak with pt regarding discharge plan. Pt was not in room at time of visit. LSW will try again in the morning.

## 2021-03-27 NOTE — ED PROVIDER NOTES
Emergency [address] EMERGENCY DEPARTMENT    Patient: Velvet Ochoa  MRN: [id number]  : 1965  Date of Evaluation: 3/26/2021  ED Provider: Chidi Church PA-C    Chief Complaint       Chief Complaint   Patient presents with    Emesis    Generalized Body Aches       ISIAH Ochoa is a 54 y.o. male who presents to the emergency department for feeling generally unwell for the last 2 weeks. Patient complains of fatigue, body aches, nausea/vomiting. He denies any known fever. Denies loss of taste or smell. Denies cough or congestion. Denies chest pain or sob. Denies abd pain. No known sick contacts. Has been taking Dayquil/Nyquil without relief. ROS     CONSTITUTIONAL:  Denies fever. + fatigue, body aches. EYES:  Denies visual changes. HEAD:  Denies headache. ENT:  Denies earache, nasal congestion, sore throat. NECK:  Denies neck pain. RESPIRATORY:  Denies any shortness of breath. CARDIOVASCULAR:  Denies chest pain. GI:  + n/v.  :  Denies urinary symptoms. MUSCULOSKELETAL:  Denies extremity pain or swelling. BACK:  Denies back pain. INTEGUMENT:  Denies skin changes. LYMPHATIC:  Denies lymphadenopathy. NEUROLOGIC:  Denies any numbness/tingling. Past History   History reviewed. No pertinent past medical history.   Past Surgical History:   Procedure Laterality Date    ARM SURGERY       Social History     Socioeconomic History    Marital status: Single     Spouse name: None    Number of children: None    Years of education: None    Highest education level: None   Occupational History    None   Social Needs    Financial resource strain: None    Food insecurity     Worry: None     Inability: None    Transportation needs     Medical: None     Non-medical: None   Tobacco Use    Smoking status: Current Some Day Smoker     Packs/day: 0.50     Types: Cigarettes    Smokeless tobacco: Never Used   Substance and Sexual Activity    Alcohol use: Yes     Comment: occasionally     Drug use: Yes     Types: Marijuana    Sexual activity: None   Lifestyle    Physical activity     Days per week: None     Minutes per session: None    Stress: None   Relationships    Social connections     Talks on phone: None     Gets together: None     Attends [employer] service: None     Active member of club or organization: None     Attends meetings of clubs or organizations: None     Relationship status: None    Intimate partner violence     Fear of current or ex partner: None     Emotionally abused: None     Physically abused: None     Forced sexual activity: None   Other Topics Concern    None   Social History Narrative    None       Medications/Allergies     Previous Medications    ALBUTEROL SULFATE HFA (PROVENTIL HFA) 108 (90 BASE) MCG/ACT INHALER    Inhale 2 puffs into the lungs every 4 hours as needed for Wheezing or Shortness of Breath With spacer (and mask if indicated). Thanks. IBUPROFEN (ADVIL;MOTRIN) 600 MG TABLET    Take 1 tablet by mouth every 6 hours as needed for Pain     Allergies   Allergen Reactions    Bee Venom Anaphylaxis    Pcn [Penicillins] Anaphylaxis        Physical Exam       ED Triage Vitals   BP Temp Temp Source Pulse Resp SpO2 Height Weight   03/26/21 2009 03/26/21 2008 03/26/21 2008 03/26/21 2008 03/26/21 2008 03/26/21 2008 03/26/21 2002 03/26/21 2002   (!) 156/91 97.6 °F (36.4 °C) Oral 84 18 98 % 5' 10\" (1.778 m) 200 lb (90.7 kg)     GENERAL APPEARANCE:  Well-developed, well-nourished, no acute distress. Drinking a Sonic soda on exam.  HEAD:  NC/AT. EYES:  Sclera anicteric. ENT:  Ears, nose, mouth normal.     NECK:  Supple. CARDIO:  RRR. LUNGS:   CTAB. Respirations unlabored. ABDOMEN:  Soft, non-distended, non-tender. BS active. EXTREMITIES:  No acute deformities. SKIN:  Warm and dry. NEUROLOGICAL:  Alert and oriented. PSYCHIATRIC:  Normal mood.      Diagnostics     Labs:  Labs Reviewed CBC WITH AUTO DIFFERENTIAL - Abnormal; Notable for the following components:       Result Value    RBC 4.54 (*)     Hemoglobin 13.3 (*)     Hematocrit 40.7 (*)     Monocytes % 12.4 (*)     Basophils % 1.3 (*)     All other components within normal limits   COMPREHENSIVE METABOLIC PANEL W/ REFLEX TO MG FOR LOW K - Abnormal; Notable for the following components:    Potassium 3.0 (*)     CREATININE 0.8 (*)     Glucose 122 (*)     Albumin 2.9 (*)     Total Protein 6.2 (*)     Total Bilirubin 1.8 (*)      (*)      (*)     Alkaline Phosphatase 386 (*)     All other components within normal limits   BRAIN NATRIURETIC PEPTIDE - Abnormal; Notable for the following components:    Pro-.5 (*)     All other components within normal limits   URINE RT REFLEX TO CULTURE - Abnormal; Notable for the following components:    Color, UA ERNESTO (*)     Clarity, UA SLIGHTLY CLOUDY (*)     Bilirubin Urine SMALL (*)     Specific Gravity, UA 1.057 (*)     Urobilinogen, Urine 2.0 (*)     All other components within normal limits   MAGNESIUM - Abnormal; Notable for the following components:    Magnesium 1.7 (*)     All other components within normal limits   SALICYLATE LEVEL - Abnormal; Notable for the following components:    Salicylate Lvl <5.3 (*)     All other components within normal limits   URINE DRUG SCREEN - Abnormal; Notable for the following components:    Cannabinoid Scrn, Ur UNCONFIRMED POSITIVE (*)     Amphetamines UNCONFIRMED POSITIVE (*)     All other components within normal limits   HEPATITIS PANEL, ACUTE - Abnormal; Notable for the following components:    Hepatitis C Ab REPEATEDLY REACTIVE (*)     All other components within normal limits   ACETAMINOPHEN LEVEL - Abnormal; Notable for the following components:    Acetaminophen Level <5.0 (*)     All other components within normal limits   COVID-19, RAPID   RESPIRATORY PANEL, MOLECULAR, WITH COVID-19   LIPASE   TROPONIN   ICTOTEST, URINE   PROTIME-INR APTT   HIV SCREEN   ETHANOL   HEPATIC FUNCTION PANEL     Radiographs:  Ct Abdomen Pelvis W Iv Contrast Additional Contrast? None    Result Date: 3/26/2021  EXAMINATION: CT OF THE ABDOMEN AND PELVIS WITH CONTRAST 3/26/2021 10:25 pm TECHNIQUE: CT of the abdomen and pelvis was performed with the administration of intravenous contrast. Multiplanar reformatted images are provided for review. Dose modulation, iterative reconstruction, and/or weight based adjustment of the mA/kV was utilized to reduce the radiation dose to as low as reasonably achievable. COMPARISON: CT pelvis without contrast dated 12/03/2020. HISTORY: ORDERING SYSTEM PROVIDED HISTORY: feels unwell, elevated lfts TECHNOLOGIST PROVIDED HISTORY: Reason for exam:->feels unwell, elevated lfts Additional Contrast?->None Decision Support Exception->Emergency Medical Condition (MA) Reason for Exam: feels unwell, elevated lfts Acuity: Acute Type of Exam: Initial Additional signs and symptoms: no a/p surg per chart. Relevant Medical/Surgical History: 80 ml isovue 370 used. FINDINGS: Lower Chest: Mild bibasilar atelectasis. Organs:  Liver enhances normally without evidence of intrahepatic biliary ductal dilatation. There is diffuse gallbladder wall thickening and pericholecystic fluid. No radiopaque gallstone is identified. There appears to be dilation of the cystic duct but the common duct appears normal in caliber and there is no intrahepatic ductal dilatation. The spleen, pancreas and adrenal glands are unremarkable. The kidneys and ureters are unremarkable. GI/Bowel: There is no evidence of bowel obstruction. No evidence of abnormal bowel wall thickening or distension. The appendix is visualized and is unremarkable. No evidence of acute appendicitis. Stomach and duodenal sweep demonstrate no acute abnormality. Pelvis: The bladder and reproductive organs are unremarkable Peritoneum/Retroperitoneum: No evidence of ascites or free air.   No evidence of lymphadenopathy. Aorta is normal in caliber. Bones/Soft Tissues: Surrounding osseous and soft tissue structures are unremarkable. Findings conspire wall with cholecystitis. Follow-up ultrasound recommended. Xr Chest Portable    Result Date: 3/26/2021  EXAMINATION: ONE XRAY VIEW OF THE CHEST 3/26/2021 8:28 pm COMPARISON: 08/26/2020 HISTORY: ORDERING SYSTEM PROVIDED HISTORY: generalized body aches TECHNOLOGIST PROVIDED HISTORY: Reason for exam:->generalized body aches Reason for Exam: generalized body aches Acuity: Acute Type of Exam: Initial Additional signs and symptoms: na Relevant Medical/Surgical History: na FINDINGS: Subtle patchy ground-glass opacities in the lung bases. No pneumothorax or pleural effusion. Cardiac and mediastinal contours normal.  No acute osseous abnormality. Bibasilar pulmonary opacities concerning for atypical/viral pneumonia. Us Abdomen Limited    Result Date: 3/27/2021  EXAMINATION: RIGHT UPPER QUADRANT ULTRASOUND 3/26/2021 11:52 pm COMPARISON: CT abdomen and pelvis 03/26/2021. HISTORY: ORDERING SYSTEM PROVIDED HISTORY: elevated lfts, further eval from ct TECHNOLOGIST PROVIDED HISTORY: Reason for exam:->elevated lfts, further eval from ct Reason for Exam: elevated LFTs, abnormal CT Acuity: Acute Type of Exam: Initial FINDINGS: LIVER:  The liver demonstrates slightly increased coarse echogenicity without evidence of intrahepatic biliary ductal dilatation. BILIARY SYSTEM:  Gallbladder wall is markedly thickened with some pericholecystic fluid present. No definite gallstone identified. .  Negative sonographic Smith's sign. Common bile duct is dilated measuring 8.1 mm. RIGHT KIDNEY: The right kidney is grossly unremarkable without evidence of hydronephrosis. 10.0 cm length. PANCREAS:  Visualized portions of the pancreas are unremarkable. OTHER: No evidence of right upper quadrant ascites. Severe gallbladder disease favored to be chronic cholecystitis.   No definite finding of acute cholecystitis. No cholelithiasis seen. Marked dilatation of the common duct without obvious cause. Slight increased appendix echogenicity consistent with fatty infiltration. RECOMMENDATIONS: MRCP suggested for further evaluation. Procedures/EKG:   Please see Dr. Curry Meng note for interpretation. ED Course and MDM   -  Patient seen and evaluated in the emergency department. -  Triage and nursing notes reviewed and incorporated. -  Old chart records reviewed and incorporated. -  Supervising physician was Dr. Jeff Ramos. Patient was seen independently. -  Work-up included:  See above  -  ED medications:  NS, potassium, magnesium, Rocephin, Zithromax, Toradol, Zofran  -  Results discussed with patient. Patient with no leukocytosis. K is 3.0, total bili 1.8, , , Alk Phos 386. Lipase is normal.  Mg 1.7. Negative rapid COVID. CXR with bilateral opacities concerning for atypical/viral pneumonia. I did obtain a CT abd due to patient's LFTs which is consistent with cholecystitis. US RUQ showing severe gallbladder disease favoring chronic cholecystitis. I discussed case with Dr. Fercho Vang, Gen Surg on call, who will see patient in the morning. Dr. Toma Reddy, hospitalist, will see and admit. CRITICAL CARE NOTE:  There was a high probability of clinically significant life-threatening deterioration of the patient's condition requiring my urgent intervention due to pneumonia/cholecystitis. Telemetry monitoring, review of labs and imaging, IV medications and fluids, consult Gen Surg and hospitalist was performed to address this. Total critical care time is at least  40 minutes. This includes vital sign monitoring, pulse oximetry monitoring, telemetry monitoring, clinical response to the IV medications, reviewing the nursing notes, consultation time, dictation/documentation time, and interpretation of the lab work.  This time excludes time spent performing procedures and separately billable procedures and family discussion time. In light of current events, I did utilize appropriate PPE (including N95 and surgical face mask, safety glasses, and gloves, as recommended by the health facility/national standard best practice, during my bedside interactions with the patient. Final Impression      1. Pneumonia due to organism    2. Cholecystitis    3. Elevated LFTs    4. Hypokalemia    5.  Hypomagnesemia            DISPOSITION        Delta Halma, [address] [place] [address]  03/27/21 1356

## 2021-03-27 NOTE — OP NOTE
tiffany-umbilical skin and an incision was made. A Kocher clamp was used to grasp the umbilical fascia and elevate it while a Veress needle was placed. The needle was aspirated and drop test was performed and the abdomen was insufflated to 15 mmHg. Using a 5 mm optical trocar, the peritoneum was entered without incidence or damage to nearby structures. 3 additional trocars were introduced under direct vision. All skin incisions were infiltrated with local anesthetic prior to making the incisions and placing the trocars. The patient was then positioned in reverse trendelenburg with the right side up. The gallbladder was identified, the fundus grasped, and retracted cephalad. The gallbladder was acutely inflamed and edematous with tissue paper consistence to the gallbladder wall. Adhesions were taken down with a combination of blunt dissection and with electrocautery, taking care not to injure any adjacent organs or viscus. The infundibulum was grasped and retracted laterally, exposing the peritoneum overlying the triangle of Calot. This peritoneum was divided and exposed in a blunt fashion. The cystic duct was clearly identified and bluntly dissected circumferentially. The junctions of the gallbladder and cystic duct were clearly identified. The cystic artery was clearly identified in a similar fashion. At the point, the critical view of safety was accomplished: the hepatocystic triangle was cleared of fat and fibrous tissue, the lower one third of the gallbladder was  from the liver to expose the cystic plate, and two and only two structures were seen entering the gallbladder. Surgical clips were applied to the cystic duct and cystic artery (two on the stay side and one on the specimen side for each structure) and the cystic duct and cystic artery were ligated with Endo Britton. The gallbladder was dissected from the liver bed in retrograde fashion with the electrocautery.  An Endo Catch specimen retrieval bag was introduced into the patient's abdomen and the gallbladder was placed into the bag. The gallbladder was removed from the patient's abdomen under direct vision. The liver bed was inspected. There was some bleeding from the lower gallbladder fossa that was controlled after application of cautery, several hemaclips and surgicel. Surgicel was removed and the site was reinspected and was without bleeding. A new piece of surgicel was left in place in the gallbladder fossa. Hemostasis was appropriate. Any spilled blood or bile was removed with suction . The port site the gallbladder was removed through was closed using a suture passer and 0 vicryl suture. Pneumoperitoneum was desufflated after viewing removal of the remaining trocars under direct vision. The wounds were then closed with absorbable suture. Dermabond was applied. At the end of the case, instrument counts, sponge counts, and needle counts were correct. The patient was extubated and transferred to the PACU in stable condition.     Electronically signed by Rylee Schaefer MD on 3/27/2021 at 4:16 PM

## 2021-03-27 NOTE — CONSULTS
Department of General Surgery   Surgical Service Dr. Lj Epps   Consult Note    Date of Consult: 3/27/21    Reason for Consult:  Fatigue, abdominal pain, elevated LFTs  Requesting Physician:  Sarah Pacheco    CHIEF COMPLAINT:  fatigue    History Obtained From:  patient    HISTORY OF PRESENT ILLNESS:    The patient is a 54 y.o. male who presented with increasing fatigue for the past 2 weeks. He also has GI complaints. He gets abdominal discomfort when he eats too large of meals. It does not typically matter what kind of food he eats. He has had nausea. He reports associated aches all over. Denies pale stools or dark urine. He reports alcohol use 3-4 times per week but heavier use in the past.  Smokes 1 pack per 2.5 days. Denies other complaints. Workup is significant for contracted gallbladder with thickened wall and dilated CBD at 8.1mm on US. LFT abnormalities are present including bilirubin that is up to 2.5 this morning. Past Medical History:    History reviewed. No pertinent past medical history.     Past Surgical History:    Past Surgical History:   Procedure Laterality Date    ARM SURGERY         Current Medications:   Current Facility-Administered Medications   Medication Dose Route Frequency Provider Last Rate Last Admin    sodium chloride flush 0.9 % injection 10 mL  10 mL Intravenous 2 times per day [facility], DO        sodium chloride flush 0.9 % injection 10 mL  10 mL Intravenous PRN [facility], DO        0.9 % sodium chloride infusion  25 mL Intravenous PRN [facility], DO        promethazine (PHENERGAN) tablet 12.5 mg  12.5 mg Oral Q6H PRN [facility], DO        Or    ondansetron (ZOFRAN) injection 4 mg  4 mg Intravenous Q6H PRN [facility], DO        levoFLOXacin (LEVAQUIN) 750 MG/150ML infusion 750 mg  750 mg Intravenous Q24H [facility],  mL/hr at 03/27/21 0632 750 mg at 03/27/21 9640    metronidazole (FLAGYL) 500 mg in NaCl 100 mL IVPB premix  500 HPI  Genitourinary: Negative for difficulty urinating. Neurological: Negative for dizziness, syncope and numbness. Hematological: Does not bruise/bleed easily. PHYSICAL EXAM:  Vitals:    03/27/21 0235 03/27/21 0301 03/27/21 0416 03/27/21 0434   BP: (!) 171/92 (!) 162/97  (!) 156/97   Pulse: 84 82 83 115   Resp: 15 21 21    Temp:   97.8 °F (36.6 °C)    TempSrc:   Oral    SpO2: 97% 96% 97%    Weight:   206 lb (93.4 kg)    Height:   5' 10\" (1.778 m)        Physical Exam  General: awake, alert, in no acute distress  HEENT: mucous membranes moist  Respiratory: normal effort, no wheezes appreciated  CV: appears well perfused, regular rate and rhythm  Abdomen: Soft, mild diffuse tenderness including tenderness with deep palpation of the RUQ, no rebound or guarding  Skin: warm and dry  Extremities: atraumatic  Neuro: no focal deficits noted  Psych: mood normal        DATA:    Lab Results   Component Value Date    WBC 6.6 03/27/2021    HGB 13.8 03/27/2021    HCT 42.1 03/27/2021    MCV 91.7 03/27/2021     03/27/2021     Lab Results   Component Value Date     03/27/2021    K 3.3 03/27/2021     03/27/2021    CO2 24 03/27/2021    BUN 12 03/27/2021    CREATININE 0.6 03/27/2021    GLUCOSE 109 03/27/2021    CALCIUM 8.1 03/27/2021    CXR  Impression   Bibasilar pulmonary opacities concerning for atypical/viral pneumonia. CT  Impression   Findings conspire wall with cholecystitis.  Follow-up ultrasound recommended. US  Impression   Severe gallbladder disease favored to be chronic cholecystitis.  No definite   finding of acute cholecystitis.  No cholelithiasis seen.  Marked dilatation   of the common duct without obvious cause.  Slight increased appendix   echogenicity consistent with fatty infiltration.       RECOMMENDATIONS:   MRCP suggested for further evaluation. IMPRESSION:    54 y.o. male with generalized aches and fatigue, also with abdominal pain and LFT elevation.   Dilated common bile duct on US.     Patient Active Problem List:     Nausea and vomiting     Transaminitis        PLAN:  - agree with GI evaluation given LFT elevation  - will go ahead and order MRCP--GI may cancel if they feel ERCP is indicated  - recommend cholecystectomy this admission, can possibly schedule for later today if MRCP is negative for choledocholithiasis  - recommend continued NPO with IVF        Electronically signed by Max Montejo MD on 3/27/2021 at 9:02 AM

## 2021-03-27 NOTE — ED NOTES
Called about covid results states states will be resulted in 2 minutes     Js Haines RN  03/27/21 [id number]

## 2021-03-27 NOTE — ED NOTES
Upon evaluation of the client, they are observed to be alert and oriented to person, place and situation. The head of the bed is elevated above 30 degrees. The client verbalizes appropriately to all questions and/or comments. The client also makes eye contact when prompted. The client also exhibits unlabored breathing, their skin is pink, warm & dry, and are observed to have relaxed extremities. When communicating, the client speaks with clear speech and normal tone and is in no apparent distress. The call light is within reach, the bed is in the low position and both side rails are up.      Naomi Johnston RN  03/26/21 0354

## 2021-03-27 NOTE — H&P
generalized weakness, fatigue, and headache that progressed to the point where patient decided to come in and be seen. Patient endorses chills, night sweats, nausea with emesis, and chronic cough. Patient states chronic cough and baseline shortness of breath has been chronic over the last 2 years and unchanged. Patient does endorse 1/2 pack/day tobacco use. Patient did endorse remote history of heroin use in the [de-identified] and 90s and meth use within the last year. Patient was told remotely in the past to stop drinking alcohol but is unsure why. Patient states he was told at 1 point he had hepatitis C but was told he cleared the disease. Patient denies any fevers, sputum production, dysuria, frequency, urgency, dark tarry stools, blood per rectum. Patient is a difficult historian and no further history was provided. Discussed case with ED provider.     ROS:   CONSTITUTIONAL:  positive for  chills, sweats, fatigue and malaise  negative for  fevers  EYES:  negative except for  redness  HEENT:  negative for  hearing loss, nasal congestion and sore throat  RESPIRATORY:  negative except for  dry cough and dyspnea  CARDIOVASCULAR:  negative for  chest pain, palpitations, exertional chest pressure/discomfort, syncope  GASTROINTESTINAL:  negative for nausea, vomiting and abdominal pain  GENITOURINARY:  negative for frequency, dysuria and hematuria  INTEGUMENT/BREAST:  negative for skin color change and pruritus  ENDOCRINE:  negative for weight changes  NEUROLOGICAL:  negative except for headaches    Objective:   No intake or output data in the 24 hours ending 03/27/21 0217   Vitals:   Vitals:    03/27/21 0130   BP: (!) 141/80   Pulse: 93   Resp: 28   Temp:    SpO2: 97%     BP (!) 141/80   Pulse 93   Temp 97.8 °F (36.6 °C) (Oral)   Resp 28   Ht 5' 10\" (1.778 m)   Wt 200 lb (90.7 kg)   SpO2 97%   BMI 28.70 kg/m²   Physical Exam:   CONSTITUTIONAL:  fatigued, alert and mildly obese  EYES:  Lids and lashes normal, pupils equal, round and reactive to light, extra ocular muscles intact, sclera clear, conjunctiva normal  ENT:  Normocephalic, without obvious abnormality, atraumatic, sinuses nontender on palpation, external ears without lesions, oral pharynx with moist mucus membranes, tonsils without erythema or exudates, gums normal and good dentition. NECK:  Supple, symmetrical, trachea midline, no adenopathy, thyroid symmetric, not enlarged and no tenderness, skin normal  LUNGS:  No increased work of breathing, good air exchange, clear to auscultation bilaterally, no crackles or wheezing  CARDIOVASCULAR:  Normal apical impulse, regular rate and rhythm, normal S1 and S2, no S3 or S4, and no murmur noted  ABDOMEN:  normal bowel sounds, soft, non-distended, tenderness noted in the right upper quadrant Smith's sign is present, involuntary guarding absent, no masses palpated and ascites absent  MUSCULOSKELETAL:  There is no redness, warmth, or swelling of the joints. Full range of motion noted. Motor strength is 5 out of 5 all extremities bilaterally. Tone is normal.  NEUROLOGIC:  Awake, alert, oriented to name, place and time. Cranial nerves II-XII are grossly intact. Motor is 5 out of 5 bilaterally. Cerebellar finger to nose, heel to shin intact. Sensory is intact. Babinski down going, Romberg negative, and gait is normal.  SKIN:  no rashes and no jaundice    Past Medical History:    History reviewed. No pertinent past medical history. PSHX:  has a past surgical history that includes Arm Surgery. Allergies: Allergies   Allergen Reactions    Bee Venom Anaphylaxis    Pcn [Penicillins] Anaphylaxis       FAM HX: He was adopted. Family history is unknown by patient.   Soc HX:   Social History     Socioeconomic History    Marital status: Single     Spouse name: None    Number of children: None    Years of education: None    Highest education level: None   Occupational History    None   Social Needs    Financial resource strain: None    Food insecurity     Worry: None     Inability: None    Transportation needs     Medical: None     Non-medical: None   Tobacco Use    Smoking status: Current Some Day Smoker     Packs/day: 0.50     Types: Cigarettes    Smokeless tobacco: Never Used   Substance and Sexual Activity    Alcohol use: Yes     Comment: occasionally     Drug use: Yes     Types: Marijuana    Sexual activity: None   Lifestyle    Physical activity     Days per week: None     Minutes per session: None    Stress: None   Relationships    Social connections     Talks on phone: None     Gets together: None     Attends Gnosticism service: None     Active member of club or organization: None     Attends meetings of clubs or organizations: None     Relationship status: None    Intimate partner violence     Fear of current or ex partner: None     Emotionally abused: None     Physically abused: None     Forced sexual activity: None   Other Topics Concern    None   Social History Narrative    None       Data:     CBC:   Lab Results   Component Value Date    WBC 7.0 03/26/2021    RBC 4.54 03/26/2021    HGB 13.3 03/26/2021    HCT 40.7 03/26/2021    MCV 89.6 03/26/2021    MCH 29.3 03/26/2021    MCHC 32.7 03/26/2021    RDW 13.7 03/26/2021     03/26/2021    MPV 9.7 03/26/2021     CMP:    Lab Results   Component Value Date     03/26/2021    K 3.0 03/26/2021     03/26/2021    CO2 27 03/26/2021    BUN 16 03/26/2021    CREATININE 0.8 03/26/2021    GFRAA >60 03/26/2021    LABGLOM >60 03/26/2021    GLUCOSE 122 03/26/2021    PROT 6.2 03/26/2021    LABALBU 2.9 03/26/2021    CALCIUM 8.4 03/26/2021    BILITOT 1.8 03/26/2021    ALKPHOS 386 03/26/2021     03/26/2021     03/26/2021     Radiology Review:   US abdomen Limited:      CT abdomen pelvis with IV contrast:      Medications:   Home Medications:   Prior to Admission medications    Medication Sig Start Date End Date Taking?  Authorizing Provider ibuprofen (ADVIL;MOTRIN) 600 MG tablet Take 1 tablet by mouth every 6 hours as needed for Pain 12/3/20   Nadine Rodríguez PA-C   albuterol sulfate HFA (PROVENTIL HFA) 108 (90 Base) MCG/ACT inhaler Inhale 2 puffs into the lungs every 4 hours as needed for Wheezing or Shortness of Breath With spacer (and mask if indicated). Thanks. 8/27/20 9/26/20  Gabbie Hernandez PA-C     Medications:    magnesium sulfate  1,000 mg Intravenous Once    potassium chloride  10 mEq Intravenous Once    sodium chloride flush  10 mL Intravenous BID      Infusions:    sodium chloride 100 mL/hr at 03/26/21 5264     PRN Meds:      Electronically signed by [facility]  on 3/27/2021 at 2:17 AM      This dictation was created with voice recognition software. While attempts have been made to review the dictation as it is transcribed, on occasion the spoken word can be misinterpreted by the technology leading to omissions or inappropriate words, phrases or sentences.

## 2021-03-28 LAB
ALBUMIN SERPL-MCNC: 3 GM/DL (ref 3.4–5)
ALP BLD-CCNC: 417 IU/L (ref 40–129)
ALT SERPL-CCNC: 737 U/L (ref 10–40)
ANION GAP SERPL CALCULATED.3IONS-SCNC: 6 MMOL/L (ref 4–16)
AST SERPL-CCNC: 489 IU/L (ref 15–37)
ATYPICAL LYMPHOCYTE ABSOLUTE COUNT: ABNORMAL
BILIRUB SERPL-MCNC: 2.6 MG/DL (ref 0–1)
BILIRUBIN DIRECT: 2.2 MG/DL (ref 0–0.3)
BILIRUBIN, INDIRECT: 0.4 MG/DL (ref 0–0.7)
BUN BLDV-MCNC: 18 MG/DL (ref 6–23)
CALCIUM SERPL-MCNC: 7.9 MG/DL (ref 8.3–10.6)
CHLORIDE BLD-SCNC: 97 MMOL/L (ref 99–110)
CO2: 26 MMOL/L (ref 21–32)
CREAT SERPL-MCNC: 0.7 MG/DL (ref 0.9–1.3)
DIFFERENTIAL TYPE: ABNORMAL
FERRITIN: 1072 NG/ML (ref 30–400)
GFR AFRICAN AMERICAN: >60 ML/MIN/1.73M2
GFR NON-AFRICAN AMERICAN: >60 ML/MIN/1.73M2
GLUCOSE BLD-MCNC: 132 MG/DL (ref 70–99)
HCT VFR BLD CALC: 42.1 % (ref 42–52)
HEMOGLOBIN: 14 GM/DL (ref 13.5–18)
IRON: 67 UG/DL (ref 59–158)
LIPASE: 60 IU/L (ref 13–60)
LYMPHOCYTES ABSOLUTE: 2.6 K/CU MM
LYMPHOCYTES RELATIVE PERCENT: 29 % (ref 24–44)
MCH RBC QN AUTO: 29.2 PG (ref 27–31)
MCHC RBC AUTO-ENTMCNC: 33.3 % (ref 32–36)
MCV RBC AUTO: 87.9 FL (ref 78–100)
MONOCYTES ABSOLUTE: 0.4 K/CU MM
MONOCYTES RELATIVE PERCENT: 4 % (ref 0–4)
PCT TRANSFERRIN: 27 % (ref 10–44)
PDW BLD-RTO: 14 % (ref 11.7–14.9)
PLATELET # BLD: 261 K/CU MM (ref 140–440)
PMV BLD AUTO: 9.8 FL (ref 7.5–11.1)
POTASSIUM SERPL-SCNC: 3.5 MMOL/L (ref 3.5–5.1)
RBC # BLD: 4.79 M/CU MM (ref 4.6–6.2)
SEGMENTED NEUTROPHILS ABSOLUTE COUNT: 6 K/CU MM
SEGMENTED NEUTROPHILS RELATIVE PERCENT: 67 % (ref 36–66)
SODIUM BLD-SCNC: 129 MMOL/L (ref 135–145)
TOTAL IRON BINDING CAPACITY: 245 UG/DL (ref 250–450)
TOTAL PROTEIN: 6 GM/DL (ref 6.4–8.2)
UNSATURATED IRON BINDING CAPACITY: 178 UG/DL (ref 110–370)
WBC # BLD: 9 K/CU MM (ref 4–10.5)

## 2021-03-28 PROCEDURE — 82248 BILIRUBIN DIRECT: CPT

## 2021-03-28 PROCEDURE — 94761 N-INVAS EAR/PLS OXIMETRY MLT: CPT

## 2021-03-28 PROCEDURE — 1200000000 HC SEMI PRIVATE

## 2021-03-28 PROCEDURE — 2580000003 HC RX 258: Performed by: SURGERY

## 2021-03-28 PROCEDURE — 85027 COMPLETE CBC AUTOMATED: CPT

## 2021-03-28 PROCEDURE — 99024 POSTOP FOLLOW-UP VISIT: CPT | Performed by: SURGERY

## 2021-03-28 PROCEDURE — 85007 BL SMEAR W/DIFF WBC COUNT: CPT

## 2021-03-28 PROCEDURE — 83690 ASSAY OF LIPASE: CPT

## 2021-03-28 PROCEDURE — 83540 ASSAY OF IRON: CPT

## 2021-03-28 PROCEDURE — 80076 HEPATIC FUNCTION PANEL: CPT

## 2021-03-28 PROCEDURE — 83550 IRON BINDING TEST: CPT

## 2021-03-28 PROCEDURE — 86038 ANTINUCLEAR ANTIBODIES: CPT

## 2021-03-28 PROCEDURE — 80048 BASIC METABOLIC PNL TOTAL CA: CPT

## 2021-03-28 PROCEDURE — 36415 COLL VENOUS BLD VENIPUNCTURE: CPT

## 2021-03-28 PROCEDURE — 82728 ASSAY OF FERRITIN: CPT

## 2021-03-28 PROCEDURE — 2500000003 HC RX 250 WO HCPCS: Performed by: SURGERY

## 2021-03-28 PROCEDURE — 99253 IP/OBS CNSLTJ NEW/EST LOW 45: CPT | Performed by: INTERNAL MEDICINE

## 2021-03-28 PROCEDURE — 6360000002 HC RX W HCPCS: Performed by: SURGERY

## 2021-03-28 PROCEDURE — 82103 ALPHA-1-ANTITRYPSIN TOTAL: CPT

## 2021-03-28 PROCEDURE — 6360000002 HC RX W HCPCS: Performed by: NURSE PRACTITIONER

## 2021-03-28 RX ADMIN — SODIUM CHLORIDE, PRESERVATIVE FREE 10 ML: 5 INJECTION INTRAVENOUS at 20:41

## 2021-03-28 RX ADMIN — METRONIDAZOLE 500 MG: 500 INJECTION, SOLUTION INTRAVENOUS at 20:30

## 2021-03-28 RX ADMIN — LEVOFLOXACIN 750 MG: 5 INJECTION, SOLUTION INTRAVENOUS at 06:15

## 2021-03-28 RX ADMIN — KETOROLAC TROMETHAMINE 15 MG: 30 INJECTION, SOLUTION INTRAMUSCULAR; INTRAVENOUS at 21:12

## 2021-03-28 RX ADMIN — KETOROLAC TROMETHAMINE 15 MG: 30 INJECTION, SOLUTION INTRAMUSCULAR; INTRAVENOUS at 06:22

## 2021-03-28 RX ADMIN — METRONIDAZOLE 500 MG: 500 INJECTION, SOLUTION INTRAVENOUS at 05:12

## 2021-03-28 RX ADMIN — METRONIDAZOLE 500 MG: 500 INJECTION, SOLUTION INTRAVENOUS at 14:17

## 2021-03-28 ASSESSMENT — PAIN DESCRIPTION - PAIN TYPE
TYPE: ACUTE PAIN
TYPE: ACUTE PAIN

## 2021-03-28 ASSESSMENT — PAIN SCALES - GENERAL: PAINLEVEL_OUTOF10: 0

## 2021-03-28 ASSESSMENT — PAIN DESCRIPTION - LOCATION: LOCATION: ABDOMEN

## 2021-03-28 NOTE — CARE COORDINATION
LSW spoke with pt about discharge plans. Pt lives alone in an apt. Pt denies any DME or HC in the home. Pt stated he is very independent of his ADLs. Pt has insurance to help with his meds. Pt does not have a PCP. LSW placed a PCP list on pt AVS.  LSW spoke with pt regarding the consult for need for rehab. Pt stated that he does drink alcohol but he does not feel it is a concern. Pt stated he has had a problem in his early 25s but is doing better. Pt stated he drinks maybe 1-2 beers a day but does not feel he has a problem. Pt declines rehab resources. Pt plans home and denies any needs.

## 2021-03-29 VITALS
HEIGHT: 70 IN | WEIGHT: 210.4 LBS | TEMPERATURE: 97.7 F | DIASTOLIC BLOOD PRESSURE: 85 MMHG | BODY MASS INDEX: 30.12 KG/M2 | SYSTOLIC BLOOD PRESSURE: 159 MMHG | HEART RATE: 99 BPM | RESPIRATION RATE: 21 BRPM | OXYGEN SATURATION: 97 %

## 2021-03-29 LAB
ALBUMIN SERPL-MCNC: 2.6 GM/DL (ref 3.4–5)
ALP BLD-CCNC: 376 IU/L (ref 40–129)
ALT SERPL-CCNC: 697 U/L (ref 10–40)
AST SERPL-CCNC: 674 IU/L (ref 15–37)
BILIRUB SERPL-MCNC: 2.5 MG/DL (ref 0–1)
BILIRUBIN DIRECT: 2 MG/DL (ref 0–0.3)
BILIRUBIN, INDIRECT: 0.5 MG/DL (ref 0–0.7)
TOTAL PROTEIN: 5.6 GM/DL (ref 6.4–8.2)

## 2021-03-29 PROCEDURE — 99024 POSTOP FOLLOW-UP VISIT: CPT | Performed by: SURGERY

## 2021-03-29 PROCEDURE — 6360000002 HC RX W HCPCS: Performed by: NURSE PRACTITIONER

## 2021-03-29 PROCEDURE — 2500000003 HC RX 250 WO HCPCS: Performed by: SURGERY

## 2021-03-29 PROCEDURE — 80076 HEPATIC FUNCTION PANEL: CPT

## 2021-03-29 PROCEDURE — 2580000003 HC RX 258: Performed by: SURGERY

## 2021-03-29 PROCEDURE — 6360000002 HC RX W HCPCS: Performed by: SURGERY

## 2021-03-29 PROCEDURE — 36415 COLL VENOUS BLD VENIPUNCTURE: CPT

## 2021-03-29 RX ORDER — OXYCODONE HYDROCHLORIDE AND ACETAMINOPHEN 5; 325 MG/1; MG/1
1 TABLET ORAL EVERY 6 HOURS PRN
Qty: 12 TABLET | Refills: 0 | Status: SHIPPED | OUTPATIENT
Start: 2021-03-29 | End: 2021-04-01

## 2021-03-29 RX ADMIN — KETOROLAC TROMETHAMINE 15 MG: 30 INJECTION, SOLUTION INTRAMUSCULAR; INTRAVENOUS at 03:56

## 2021-03-29 RX ADMIN — LEVOFLOXACIN 750 MG: 5 INJECTION, SOLUTION INTRAVENOUS at 06:44

## 2021-03-29 RX ADMIN — METRONIDAZOLE 500 MG: 500 INJECTION, SOLUTION INTRAVENOUS at 05:38

## 2021-03-29 RX ADMIN — SODIUM CHLORIDE, PRESERVATIVE FREE 10 ML: 5 INJECTION INTRAVENOUS at 09:29

## 2021-03-29 ASSESSMENT — PAIN DESCRIPTION - LOCATION: LOCATION: ABDOMEN

## 2021-03-29 ASSESSMENT — PAIN SCALES - GENERAL: PAINLEVEL_OUTOF10: 7

## 2021-03-29 ASSESSMENT — PAIN DESCRIPTION - PAIN TYPE: TYPE: ACUTE PAIN

## 2021-03-29 NOTE — PROGRESS NOTES
1617- pt arrived from OR, monitors applied. Report received from [facility] and [facility]. Respirations even and unlabored. Abdominal incision sites well approximated with surgical glue dry and intact. VSS  1640- pt awakens to name and follow simple commands. Denies pain or nausea at this time. Ice chips given. 1656-  Recovery complete, report called to [facility]. Preparing pt for transfer. 1708- pt nauseated, medication given see eMAR. Cool washcloth applied to forehead. VSS  1735- pt to room 3015 pt placed on monitor. Steven Oneal RN aware of arrival and to assume primary care. Call light in reach.
GENERAL SURGERY PROGRESS NOTE    Carlee Hdz is a 54 y.o. male s/p laparoscopic cholecystectomy on 3/27 for cholecystitis. Subjective:  Doing ok today. Denies new complaints. Labs not yet drawn today. Objective:    Vitals: VITALS:  BP (!) 170/89   Pulse 85   Temp 98.1 °F (36.7 °C) (Oral)   Resp 18   Ht 5' 10\" (1.778 m)   Wt 210 lb 6.4 oz (95.4 kg)   SpO2 96%   BMI 30.19 kg/m²     I/O: No intake/output data recorded. Labs/Imaging Results:   Lab Results   Component Value Date     03/28/2021    K 3.5 03/28/2021    CL 97 03/28/2021    CO2 26 03/28/2021    BUN 18 03/28/2021    CREATININE 0.7 03/28/2021    GLUCOSE 132 03/28/2021    CALCIUM 7.9 03/28/2021      Lab Results   Component Value Date    WBC 9.0 03/28/2021    HGB 14.0 03/28/2021    HCT 42.1 03/28/2021    MCV 87.9 03/28/2021     03/28/2021       IV Fluids: sodium chloride    Scheduled Meds:   sodium chloride flush, 10 mL, Intravenous, 2 times per day    levofloxacin, 750 mg, Intravenous, Q24H    metroNIDAZOLE, 500 mg, Intravenous, Q8H    Physical Exam:  General: A&O x 3, no distress. HEENT: Anicteric sclerae, MMM. Extremities: No edema bilat LE. Abdomen: Soft, appropriately tender, port sites clean and dry with dermabond      Assessment and Plan:  54 y.o. male s/p laparoscopic cholecystectomy. Doing well however LFTs remained elevated yesterday.     Patient Active Problem List:     Nausea and vomiting     Transaminitis      - regular diet  - increase ambulation  - awaiting LFTs today--ok for discharge from a surgical perspective once downtrending  - appreciate GI input and Hospitalist assistance with laina Klein MD
GENERAL SURGERY PROGRESS NOTE    Justa Mcclure is a 54 y.o. male s/p laparoscopic cholecystectomy on 3/27 for cholecystitis. Subjective:  Reports feeling much better today. Pain well controlled. Denies nausea. Objective:    Vitals: VITALS:  BP (!) 143/98   Pulse 80   Temp 97.2 °F (36.2 °C) (Oral)   Resp 15   Ht 5' 10\" (1.778 m)   Wt 209 lb 14.4 oz (95.2 kg)   SpO2 97%   BMI 30.12 kg/m²     I/O: 03/27 0701 - 03/28 0700  In: 1105 [P.O.:5; I.V.:1100]  Out: 550 [Urine:300]    Labs/Imaging Results:   Lab Results   Component Value Date     03/28/2021    K 3.5 03/28/2021    CL 97 03/28/2021    CO2 26 03/28/2021    BUN 18 03/28/2021    CREATININE 0.7 03/28/2021    GLUCOSE 132 03/28/2021    CALCIUM 7.9 03/28/2021      Lab Results   Component Value Date    WBC 9.0 03/28/2021    HGB 14.0 03/28/2021    HCT 42.1 03/28/2021    MCV 87.9 03/28/2021     03/28/2021       IV Fluids: sodium chloride    Scheduled Meds:   sodium chloride flush, 10 mL, Intravenous, 2 times per day    levofloxacin, 750 mg, Intravenous, Q24H    metroNIDAZOLE, 500 mg, Intravenous, Q8H    Physical Exam:  General: A&O x 3, no distress. HEENT: Anicteric sclerae, MMM. Extremities: No edema bilat LE. Abdomen: Soft, appropriately tender, port sites clean and dry with dermabond      Assessment and Plan:  54 y.o. male s/p laparoscopic cholecystectomy. Doing well however LFTs remain elevated today.     Patient Active Problem List:     Nausea and vomiting     Transaminitis      - regular diet  - increase ambulation  - will recheck LFTs in AM--ok for discharge once downtrending  - appreciate GI input and Hospitalist assistance with laina Aguilar MD
Patient is seen and examined, complains of generalized body aches and weakness. He drinks regularly 2 beers a day, will monitor closely for withdrawal.  He reports a history of hepatitis C he states he has been treated for it. Imaging concerning for a dilated CBD of 8.1 as well as elevated liver enzymes and bilirubin. Discussed with general surgery plan is for ERCP. Also likely to need a cholecystectomy. GI on board , recommendations pending.
membranes are moist. Oral pharynx without exudates, no evidence of thrush. NECK Supple, no apparent thyromegaly or masses. RESP Clear to auscultation, no wheezes, rales or rhonchi. Symmetric chest movement while on room air. CARDIO/VASC S1/S2 auscultated. Regular rate without appreciable murmurs, rubs, or gallops. No JVD or carotid bruits. Peripheral pulses equal bilaterally and palpable. No peripheral edema. GI Abdomen is soft without significant tenderness, masses, or guarding. Bowel sounds are normoactive. Abdominal laparoscopic scars and clean Dermabond. PSYCH Awake, alert, oriented x 4. Affect appropriate.     Medications:   Medications:    sodium chloride flush  10 mL Intravenous 2 times per day    levofloxacin  750 mg Intravenous Q24H    metroNIDAZOLE  500 mg Intravenous Q8H      Infusions:    sodium chloride       PRN Meds: sodium chloride flush, 10 mL, PRN  sodium chloride, 25 mL, PRN  promethazine, 12.5 mg, Q6H PRN    Or  ondansetron, 4 mg, Q6H PRN  ketorolac, 15 mg, Q6H PRN          Electronically signed by Yosef Henry MD on 3/28/2021 at 11:14 AM

## 2021-03-29 NOTE — DISCHARGE SUMMARY
Discharge Summary    Name:  Shantell Pettit /Age/Sex: 1965  (54 y.o. male)   MRN & CSN:  [id number] & [id number] Admission Date/Time: 3/26/2021  8:05 PM   Attending:  Rebecca De Dios MD Discharging Physician: Antonino Nicolas MD     Hospital Course:   Shantell Pettit is a 54 y.o.  male  who presents with Transaminitis        #Severe gallbladder disease: Gangrenous gallbladder   #Transaminitis secondary to above  #CBD duct dilatation, 8.1 mm  -Status post laparoscopic cholecystectomy DOS 3/27/2021  -Procedure well-tolerated  -Liver enzymes down trending  -on regular diet per surgery  -Continue antibiotics  -Pain control  -Spirometry recommendation  -GI and general surgery on board           #History of IVDA  #History of treated hepatitis C??  #EtOH abuse  Patient counseled on alcohol cessation.        #Hypokalemia and Hypomagnesemia  -Monitor and replace        The patient expressed appropriate understanding of and agreement with the discharge recommendations, medications, and plan. Consults this admission:  [address] TO HOSPITALIST  IP CONSULT TO SOCIAL WORK  IP CONSULT TO GI    Discharge Instruction:   Follow up appointments: gen surg  Primary care physician:  within 2 weeks    Diet:  regular diet   Activity: activity as tolerated  Disposition: Discharged to:   [x]Home, []HHC, []SNF, []Acute Rehab, []Hospice   Condition on discharge: Stable    Discharge Medications:      SHAHIDA Meng [address] Medication Instructions [id number]    Printed on:21   Medication Information                      albuterol sulfate HFA (PROVENTIL HFA) 108 (90 Base) MCG/ACT inhaler  Inhale 2 puffs into the lungs every 4 hours as needed for Wheezing or Shortness of Breath With spacer (and mask if indicated). Thanks.              ibuprofen (ADVIL;MOTRIN) 600 MG tablet  Take 1 tablet by mouth every 6 hours as needed for Pain             oxyCODONE-acetaminophen (PERCOCET) 5-325 MG per tablet  Take 1 tablet by mouth every 6 hours as needed for Pain for up to 3 days. Intended supply: 3 days. Take lowest dose possible to manage pain                 Objective Findings at Discharge:   BP (!) 159/85   Pulse 99   Temp 97.7 °F (36.5 °C) (Oral)   Resp 21   Ht 5' 10\" (1.778 m)   Wt 210 lb 6.4 oz (95.4 kg)   SpO2 97%   BMI 30.19 kg/m²            PHYSICAL EXAM   GEN    Awake male, sitting upright in bed in no apparent distress. Appears given age. EYES   Pupils are equally round. No scleral erythema, discharge, or conjunctivitis. HENT  Mucous membranes are moist. Oral pharynx without exudates, no evidence of thrush. NECK  Supple, no apparent thyromegaly or masses. RESP  Clear to auscultation, no wheezes, rales or rhonchi. Symmetric chest movement while on room air. CARDIO/VASC           S1/S2 auscultated. Regular rate without appreciable murmurs, rubs, or gallops. No JVD or carotid bruits. Peripheral pulses equal bilaterally and palpable. No peripheral edema. GI        Abdomen is soft without significant tenderness, masses, or guarding. Bowel sounds are normoactive. Abdominal laparoscopic scars and clean Dermabond. PSYCH            Awake, alert, oriented x 4. Affect appropriate.     BMP/CBC  Recent Labs     03/26/21  2100 03/27/21  0558 03/28/21  0433 03/28/21  1220    135  --  129*   K 3.0* 3.3*  --  3.5    102  --  97*   CO2 27 24  --  26   BUN 16 12  --  18   CREATININE 0.8* 0.6*  --  0.7*   WBC 7.0 6.6 9.0  --    HCT 40.7* 42.1 42.1  --     208 261  --        IMAGING:      Discharge Time of 35 minutes    Electronically signed by Lisa Robert MD on 3/29/2021 at 9:52 AM

## 2021-03-30 LAB
ALPHA-1 ANTITRYPSIN: 167 MG/DL (ref 90–200)
ANTI-NUCLEAR ANTIBODY (ANA): NORMAL

## 2021-03-31 LAB
EKG ATRIAL RATE: 82 BPM
EKG DIAGNOSIS: NORMAL
EKG P AXIS: 58 DEGREES
EKG P-R INTERVAL: 152 MS
EKG Q-T INTERVAL: 426 MS
EKG QRS DURATION: 104 MS
EKG QTC CALCULATION (BAZETT): 497 MS
EKG R AXIS: 50 DEGREES
EKG T AXIS: 49 DEGREES
EKG VENTRICULAR RATE: 82 BPM

## 2021-04-11 ENCOUNTER — HOSPITAL ENCOUNTER (EMERGENCY)
Age: 56
Discharge: HOME | End: 2021-04-11
Payer: COMMERCIAL

## 2021-04-11 ENCOUNTER — APPOINTMENT (OUTPATIENT)
Dept: GENERAL RADIOLOGY | Age: 56
End: 2021-04-11
Payer: COMMERCIAL

## 2021-04-11 VITALS
RESPIRATION RATE: 16 BRPM | OXYGEN SATURATION: 99 % | TEMPERATURE: 97.4 F | HEART RATE: 78 BPM | SYSTOLIC BLOOD PRESSURE: 155 MMHG | DIASTOLIC BLOOD PRESSURE: 76 MMHG
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
## 2021-04-11 DIAGNOSIS — S81.852A DOG BITE OF LEFT LOWER LEG, INITIAL ENCOUNTER: Primary | ICD-10-CM

## 2021-04-11 PROCEDURE — 99285 EMERGENCY DEPT VISIT HI MDM: CPT

## 2021-04-11 PROCEDURE — 6370000000 HC RX 637 (ALT 250 FOR IP): Performed by: PHYSICIAN ASSISTANT

## 2021-04-11 PROCEDURE — 73590 X-RAY EXAM OF LOWER LEG: CPT

## 2021-04-11 RX ORDER — DOXYCYCLINE HYCLATE 100 MG
100 TABLET ORAL 2 TIMES DAILY
Qty: 14 TABLET | Refills: 0 | Status: SHIPPED | OUTPATIENT
Start: 2021-04-11 | End: 2021-04-18

## 2021-04-11 RX ORDER — DOXYCYCLINE HYCLATE 100 MG
100 TABLET ORAL ONCE
Status: COMPLETED | OUTPATIENT
Start: 2021-04-11 | End: 2021-04-11

## 2021-04-11 RX ADMIN — DOXYCYCLINE HYCLATE 100 MG: 100 TABLET, COATED ORAL at 11:38

## 2021-04-11 ASSESSMENT — PAIN SCALES - GENERAL
PAINLEVEL_OUTOF10: 4
PAINLEVEL_OUTOF10: 6

## 2021-04-11 NOTE — CARE COORDINATION
Pt identified as potential readmission. Last admission 3/26 - 3/29 for Severe gallbladder disease: Gangrenous gallbladder. Pt had Transaminitis secondary to this. Pt was noted to have ETOH abuse but did not feel it is a concern. Pt did tell CM he has had a problem in his early 25s but is doing better; only consuming maybe 1-2 beers a day. He declined ETOH resources. Pt here today for abrasions to left leg dog bite from [facility]. Wound care provided to pt's injury and readmission was avoided- pt was able to be d/c'd home.

## 2021-04-11 NOTE — ED NOTES
Pt given animal bite form to fill out, wound cleaning supplies at bedside per [facility] request.      Anabel Grey RN  04/11/21 [id number]

## 2021-04-11 NOTE — ED TRIAGE NOTES
Pt has abrasions to left leg dog bite from neighborhood pitbu, unsure of vaccine status.  Pt states \"it just won;t quit burning\"

## 2021-04-11 NOTE — ED PROVIDER NOTES
EMERGENCY DEPARTMENT ENCOUNTER        PCP: No primary care provider on file. CHIEF COMPLAINT    Chief Complaint   Patient presents with    Animal Bite     pitbull    Leg Injury     left          This patient was not evaluated by the attending physician. I have independently evaluated this patient . HPI    Noemi Gorman is a 64 y.o. male who presents with dog bite wound to left lower leg. Onset approximately 1-several hours prior to arrival.  Context is patient was walking in the neighborhood and neighborhood dog bit left leg. There is associated pain and bleeding. Bleeding is stopped and pain is mild. No other symptoms. No distal numbness, tingling, weakness, functional/motor deficit. Pt denies foreign body sensation. Up to date Tetanus Vaccination Status        REVIEW OF SYSTEMS    General:   Denies symptoms preceding injury. Skin:  SEE HPI  Musculoskeletal:  No distal numbness, tingling. No obvious tendon or motor deficits. Denies any other musculoskeletal injuries or skin trauma. All other review of systems are negative  See HPI and nursing notes for additional information     PAST MEDICAL & SURGICAL HISTORY    No past medical history on file. Past Surgical History:   Procedure Laterality Date    ARM SURGERY      CHOLECYSTECTOMY, LAPAROSCOPIC N/A 3/27/2021    CHOLECYSTECTOMY LAPAROSCOPIC performed by Brown Mendoza MD at [address] [place] [address]    Current Outpatient Rx   Medication Sig Dispense Refill    doxycycline hyclate (VIBRA-TABS) 100 MG tablet Take 1 tablet by mouth 2 times daily for 7 days 14 tablet 0    ibuprofen (ADVIL;MOTRIN) 600 MG tablet Take 1 tablet by mouth every 6 hours as needed for Pain 30 tablet 0    albuterol sulfate HFA (PROVENTIL HFA) 108 (90 Base) MCG/ACT inhaler Inhale 2 puffs into the lungs every 4 hours as needed for Wheezing or Shortness of Breath With spacer (and mask if indicated). Thanks.  1 Inhaler 1       ALLERGIES    Allergies Allergen Reactions    Bee Venom Anaphylaxis    Pcn [Penicillins] Anaphylaxis       SOCIAL & FAMILY HISTORY    Social History     Socioeconomic History    Marital status: Single     Spouse name: Not on file    Number of children: Not on file    Years of education: Not on file    Highest education level: Not on file   Occupational History    Not on file   Social Needs    Financial resource strain: Not on file    Food insecurity     Worry: Not on file     Inability: Not on file    Transportation needs     Medical: Not on file     Non-medical: Not on file   Tobacco Use    Smoking status: Current Some Day Smoker     Packs/day: 0.50     Types: Cigarettes    Smokeless tobacco: Never Used   Substance and Sexual Activity    Alcohol use: Yes     Comment: occasionally     Drug use: Yes     Types: Marijuana    Sexual activity: Not on file   Lifestyle    Physical activity     Days per week: Not on file     Minutes per session: Not on file    Stress: Not on file   Relationships    Social connections     Talks on phone: Not on file     Gets together: Not on file     Attends Pentecostalism service: Not on file     Active member of club or organization: Not on file     Attends meetings of clubs or organizations: Not on file     Relationship status: Not on file    Intimate partner violence     Fear of current or ex partner: Not on file     Emotionally abused: Not on file     Physically abused: Not on file     Forced sexual activity: Not on file   Other Topics Concern    Not on file   Social History Narrative    Not on file     Family History   Adopted: Yes   Family history unknown: Yes           PHYSICAL EXAM    VITAL SIGNS: BP (!) 168/98   Pulse 89   Temp 97.4 °F (36.3 °C) (Oral)   Resp 18   SpO2 99%   Constitutional:  Well developed, Appears comfortable  HEENT:  Normocephalic, Atraumatic. PERRL, EOMI. Musculoskeletal:  No gross deformities. No motor deficits.   Distal sensation and capillary refill

## (undated) DEVICE — SPONGE LAP W18XL18IN WHT COT 4 PLY FLD STRUNG RADPQ DISP ST

## (undated) DEVICE — AGENT HEMSTAT W2XL14IN OXIDIZED REGENERATED CELOS ABSRB FOR

## (undated) DEVICE — GARMENT,MEDLINE,DVT,INT,CALF,MED, GEN2: Brand: MEDLINE

## (undated) DEVICE — APPLICATOR MEDICATED 26 CC SOLUTION HI LT ORNG CHLORAPREP

## (undated) DEVICE — SUTURE MCRYL SZ 4-0 L18IN ABSRB UD L19MM PS-2 3/8 CIR PRIM Y496G

## (undated) DEVICE — BLADE CLIPPER GEN PURP NS

## (undated) DEVICE — Z INACTIVE USE 2660664 SOLUTION IRRIG 3000ML 0.9% SOD CHL USP UROMATIC PLAS CONT

## (undated) DEVICE — INTENDED FOR TISSUE SEPARATION, AND OTHER PROCEDURES THAT REQUIRE A SHARP SURGICAL BLADE TO PUNCTURE OR CUT.: Brand: BARD-PARKER ® STAINLESS STEEL BLADES

## (undated) DEVICE — GLOVE SURG SZ 7 CRM LTX FREE POLYISOPRENE POLYMER BEAD ANTI

## (undated) DEVICE — SET TBNG DISP TIP FOR AHTO

## (undated) DEVICE — LOOP LIG SUT SZ 0 L18IN ABSRB POLYDIOXANONE MFIL PDS II

## (undated) DEVICE — Device

## (undated) DEVICE — SYRINGE MED 20ML STD CLR PLAS LUERLOCK TIP N CTRL DISP

## (undated) DEVICE — SOLUTION IV IRRIG WATER 1000ML POUR BRL 2F7114

## (undated) DEVICE — SHEET,DRAPE,40X58,STERILE: Brand: MEDLINE

## (undated) DEVICE — GLOVE SURG SZ 65 CRM LTX FREE POLYISOPRENE POLYMER BEAD ANTI